# Patient Record
Sex: FEMALE | NOT HISPANIC OR LATINO | URBAN - METROPOLITAN AREA
[De-identification: names, ages, dates, MRNs, and addresses within clinical notes are randomized per-mention and may not be internally consistent; named-entity substitution may affect disease eponyms.]

---

## 2023-12-20 ENCOUNTER — OCCMED (OUTPATIENT)
Dept: URGENT CARE | Facility: CLINIC | Age: 38
End: 2023-12-20

## 2023-12-20 ENCOUNTER — APPOINTMENT (OUTPATIENT)
Dept: LAB | Facility: CLINIC | Age: 38
End: 2023-12-20

## 2023-12-20 DIAGNOSIS — Z02.1 PRE-EMPLOYMENT EXAMINATION: Primary | ICD-10-CM

## 2023-12-20 DIAGNOSIS — Z02.1 PRE-EMPLOYMENT EXAMINATION: ICD-10-CM

## 2023-12-20 LAB
MEV IGG SER QL IA: NORMAL
MUV IGG SER QL IA: NORMAL
RUBV IGG SERPL IA-ACNC: 66.4 IU/ML
VZV IGG SER QL IA: NORMAL

## 2023-12-20 PROCEDURE — 86735 MUMPS ANTIBODY: CPT

## 2023-12-20 PROCEDURE — 36415 COLL VENOUS BLD VENIPUNCTURE: CPT

## 2023-12-20 PROCEDURE — 86787 VARICELLA-ZOSTER ANTIBODY: CPT

## 2023-12-20 PROCEDURE — 86765 RUBEOLA ANTIBODY: CPT

## 2023-12-20 PROCEDURE — 86480 TB TEST CELL IMMUN MEASURE: CPT

## 2023-12-20 PROCEDURE — 86762 RUBELLA ANTIBODY: CPT

## 2023-12-21 LAB
GAMMA INTERFERON BACKGROUND BLD IA-ACNC: <0 IU/ML
M TB IFN-G BLD-IMP: NEGATIVE
M TB IFN-G CD4+ BCKGRND COR BLD-ACNC: 0 IU/ML
M TB IFN-G CD4+ BCKGRND COR BLD-ACNC: 0 IU/ML
MITOGEN IGNF BCKGRD COR BLD-ACNC: 10 IU/ML

## 2024-01-20 DIAGNOSIS — Z00.6 ENCOUNTER FOR EXAMINATION FOR NORMAL COMPARISON OR CONTROL IN CLINICAL RESEARCH PROGRAM: ICD-10-CM

## 2024-04-12 ENCOUNTER — OFFICE VISIT (OUTPATIENT)
Dept: FAMILY MEDICINE CLINIC | Facility: CLINIC | Age: 39
End: 2024-04-12

## 2024-04-12 VITALS
HEART RATE: 90 BPM | BODY MASS INDEX: 47.46 KG/M2 | DIASTOLIC BLOOD PRESSURE: 80 MMHG | WEIGHT: 278 LBS | TEMPERATURE: 98.2 F | SYSTOLIC BLOOD PRESSURE: 110 MMHG | HEIGHT: 64 IN | OXYGEN SATURATION: 99 %

## 2024-04-12 DIAGNOSIS — Z13.220 SCREENING FOR LIPID DISORDERS: ICD-10-CM

## 2024-04-12 DIAGNOSIS — Z13.1 SCREENING FOR DIABETES MELLITUS: ICD-10-CM

## 2024-04-12 DIAGNOSIS — G43.909 MIGRAINE WITHOUT STATUS MIGRAINOSUS, NOT INTRACTABLE, UNSPECIFIED MIGRAINE TYPE: Primary | ICD-10-CM

## 2024-04-12 DIAGNOSIS — R00.2 PALPITATIONS: ICD-10-CM

## 2024-04-12 NOTE — ASSESSMENT & PLAN NOTE
Patient with 20-year history of migraines, with symptoms that are concerning for complicated migraines.  She had an MRI completed which was normal.  She has been on multiple medications for her migraines as prescribed by [facility] neurology, but she discontinued taking these medications as she does not like to take many pills.  Her migraines are returning however and she is experiencing symptoms such as numbness and tingling when she bends her head forward and back.  Will refer to neurology for her to become established with them for resumption of her medication therapy.  Discussed with her that the wait to get in neurology can be quite some time, and I would be willing to start a preventative medication such as Topamax in the meantime if needed.  Patient agreeable with this.

## 2024-04-12 NOTE — PROGRESS NOTES
Name: Raegan Fererll      : 1985      MRN: [id number]  Encounter Provider: Alysia Marroquin PA-C  Encounter Date: 2024   Encounter department: [facility]    Assessment & Plan     1. Migraine without status migrainosus, not intractable, unspecified migraine type  Assessment & Plan:  Patient with 20-year history of migraines, with symptoms that are concerning for complicated migraines.  She had an MRI completed which was normal.  She has been on multiple medications for her migraines as prescribed by [facility] neurology, but she discontinued taking these medications as she does not like to take many pills.  Her migraines are returning however and she is experiencing symptoms such as numbness and tingling when she bends her head forward and back.  Will refer to neurology for her to become established with them for resumption of her medication therapy.  Discussed with her that the wait to get in neurology can be quite some time, and I would be willing to start a preventative medication such as Topamax in the meantime if needed.  Patient agreeable with this.    Orders:  -     Ambulatory Referral to Neurology; Future    2. Palpitations  Assessment & Plan:  Patient with 2-week history of palpitations, no chest pain or shortness of breath.  EKG completed today normal sinus rhythm, rate 88 bpm.  Patient believes palpitations to be in part due to anxiety.  Will complete cardiac workup with Holter monitor to determine, however if Holter monitor is normal will suggest possibly treating her anxiety, which she is agreeable with this plan.  I will also evaluate lab work as she has not had any in quite some time.  Evaluating CBC, CMP, TSH, lipid panel, and A1c.  I would like to see patient for follow-up in 1 month to give her enough time to obtain the Holter monitor and for us to receive the results.  We will then go over the results at her appointment as well as discuss treatment from there.   "Patient is very agreeable with this plan and will follow-up sooner if needed.    Orders:  -     POCT ECG  -     CBC and differential; Future; Expected date: 04/12/2024  -     TSH, 3rd generation with Free T4 reflex; Future; Expected date: 04/12/2024  -     Holter monitor; Future; Expected date: 04/12/2024    3. BMI 45.0-49.9, adult ([facility])  Assessment & Plan:  Patient is committed to making a change in her diet and exercise.  Discussed referral to weight management today to help her with the process of weight loss, she is very agreeable and very appreciative of referral today.    Orders:  -     Ambulatory Referral to Weight Management; Future    4. Screening for lipid disorders  -     Lipid Panel with Direct LDL reflex; Future; Expected date: 04/12/2024    5. Screening for diabetes mellitus  -     Comprehensive metabolic panel; Future; Expected date: 04/12/2024  -     Hemoglobin A1C; Future; Expected date: 04/12/2024           Subjective      Chief Complaint   Patient presents with    Establish Care    Palpitations     Feels like fluttering  Started feeling 2 weeks ago  Off and on  Head tilt up or down induces a rush feeling in your brain and mind goes unclear    Arm Pain     Started about 3 days ago  Tingling  Feels like it is falling asleep    Migraine     Had for over 20 years  Discussion about possible medications       Patient presents today to establish care with our practice and she also has several concerns.    Patient has dealt with migraines for over 20 years, and they are usually related to her menstrual cycle.  She states that she has been on multiple medications for it in the past, but she recently discontinued her medications as she does not like to take them frequently.  She [facility].  She states her migraines occur very often.  She states when she bends down she gets a rush to the brain and down the shoulders.  She states this \"rush\" feels like the nerves start to fire.  She " "states she also feels this when she tilts her head back-and-forth.  She would like to be evaluated for migraines again and possibly start medication again.    She also started with heart fluttering about 2 weeks ago.  She endorses no chest pain or shortness of breath.  She states the fluttering occurs every so often and is not consistent.  It occurs with randomly throughout the day.  She has never had this before.  She believes it is in part due to anxiety however is concern for possible cardiac etiology.    She also has a history of a mucous retention cyst in her sinuses.  She had a case of neon fluid dripping from her nose a few days ago.  She is concerned that it was possibly \"fluid from her brain\".  She recently was sick in February with an upper respiratory infection.      Review of Systems   Constitutional:  Negative for chills, fatigue and fever.   Respiratory:  Positive for chest tightness. Negative for cough and shortness of breath.    Cardiovascular:  Positive for palpitations. Negative for chest pain and leg swelling.   Neurological:  Positive for headaches. Negative for dizziness and light-headedness.       No current outpatient medications on file prior to visit.       Objective     /80 (BP Location: Left arm, Patient Position: Sitting, Cuff Size: Standard)   Pulse 90   Temp 98.2 °F (36.8 °C) (Temporal)   Ht 5' 4\" (1.626 m)   Wt 126 kg (278 lb)   SpO2 99%   BMI 47.72 kg/m²     Physical Exam  Vitals and nursing note reviewed.   Constitutional:       General: She is not in acute distress.     Appearance: Normal appearance. She is not ill-appearing or diaphoretic.   HENT:      Head: Normocephalic and atraumatic.   Cardiovascular:      Rate and Rhythm: Normal rate and regular rhythm.      Heart sounds: No murmur heard.  Pulmonary:      Effort: Pulmonary effort is normal. No respiratory distress.   Musculoskeletal:      Right lower leg: No edema.      Left lower leg: No edema.   Skin:     " Coloration: Skin is not cyanotic or pale.   Neurological:      General: No focal deficit present.      Mental Status: She is alert and oriented to person, place, and time. Mental status is at baseline.   Psychiatric:         Mood and Affect: Mood normal.         Behavior: Behavior normal. Behavior is cooperative.         Judgment: Judgment normal.       Alysia Marroquin PA-C

## 2024-04-12 NOTE — ASSESSMENT & PLAN NOTE
Patient with 2-week history of palpitations, no chest pain or shortness of breath.  EKG completed today normal sinus rhythm, rate 88 bpm.  Patient believes palpitations to be in part due to anxiety.  Will complete cardiac workup with Holter monitor to determine, however if Holter monitor is normal will suggest possibly treating her anxiety, which she is agreeable with this plan.  I will also evaluate lab work as she has not had any in quite some time.  Evaluating CBC, CMP, TSH, lipid panel, and A1c.  I would like to see patient for follow-up in 1 month to give her enough time to obtain the Holter monitor and for us to receive the results.  We will then go over the results at her appointment as well as discuss treatment from there.  Patient is very agreeable with this plan and will follow-up sooner if needed.

## 2024-04-12 NOTE — ASSESSMENT & PLAN NOTE
Patient is committed to making a change in her diet and exercise.  Discussed referral to weight management today to help her with the process of weight loss, she is very agreeable and very appreciative of referral today.

## 2024-04-13 ENCOUNTER — APPOINTMENT (OUTPATIENT)
Dept: LAB | Facility: CLINIC | Age: 39
End: 2024-04-13
Payer: COMMERCIAL

## 2024-04-13 DIAGNOSIS — R00.2 PALPITATIONS: ICD-10-CM

## 2024-04-13 DIAGNOSIS — Z00.6 ENCOUNTER FOR EXAMINATION FOR NORMAL COMPARISON OR CONTROL IN CLINICAL RESEARCH PROGRAM: ICD-10-CM

## 2024-04-13 DIAGNOSIS — Z13.1 SCREENING FOR DIABETES MELLITUS: ICD-10-CM

## 2024-04-13 DIAGNOSIS — Z13.220 SCREENING FOR LIPID DISORDERS: ICD-10-CM

## 2024-04-13 LAB
BASOPHILS # BLD AUTO: 0.02 THOUSANDS/ÂΜL (ref 0–0.1)
BASOPHILS NFR BLD AUTO: 0 % (ref 0–1)
EOSINOPHIL # BLD AUTO: 0.16 THOUSAND/ÂΜL (ref 0–0.61)
EOSINOPHIL NFR BLD AUTO: 2 % (ref 0–6)
ERYTHROCYTE [DISTWIDTH] IN BLOOD BY AUTOMATED COUNT: 16.1 % (ref 11.6–15.1)
HCT VFR BLD AUTO: 38.6 % (ref 34.8–46.1)
HGB BLD-MCNC: 11.6 G/DL (ref 11.5–15.4)
IMM GRANULOCYTES # BLD AUTO: 0.02 THOUSAND/UL (ref 0–0.2)
IMM GRANULOCYTES NFR BLD AUTO: 0 % (ref 0–2)
LYMPHOCYTES # BLD AUTO: 2.65 THOUSANDS/ÂΜL (ref 0.6–4.47)
LYMPHOCYTES NFR BLD AUTO: 31 % (ref 14–44)
MCH RBC QN AUTO: 24.8 PG (ref 26.8–34.3)
MCHC RBC AUTO-ENTMCNC: 30.1 G/DL (ref 31.4–37.4)
MCV RBC AUTO: 83 FL (ref 82–98)
MONOCYTES # BLD AUTO: 0.55 THOUSAND/ÂΜL (ref 0.17–1.22)
MONOCYTES NFR BLD AUTO: 6 % (ref 4–12)
NEUTROPHILS # BLD AUTO: 5.24 THOUSANDS/ÂΜL (ref 1.85–7.62)
NEUTS SEG NFR BLD AUTO: 61 % (ref 43–75)
NRBC BLD AUTO-RTO: 0 /100 WBCS
PLATELET # BLD AUTO: 364 THOUSANDS/UL (ref 149–390)
PMV BLD AUTO: 9.7 FL (ref 8.9–12.7)
RBC # BLD AUTO: 4.68 MILLION/UL (ref 3.81–5.12)
TSH SERPL DL<=0.05 MIU/L-ACNC: 1.56 UIU/ML (ref 0.45–4.5)
WBC # BLD AUTO: 8.64 THOUSAND/UL (ref 4.31–10.16)

## 2024-04-13 PROCEDURE — 84443 ASSAY THYROID STIM HORMONE: CPT

## 2024-04-13 PROCEDURE — 83036 HEMOGLOBIN GLYCOSYLATED A1C: CPT

## 2024-04-13 PROCEDURE — 36415 COLL VENOUS BLD VENIPUNCTURE: CPT

## 2024-04-13 PROCEDURE — 80061 LIPID PANEL: CPT

## 2024-04-13 PROCEDURE — 85025 COMPLETE CBC W/AUTO DIFF WBC: CPT

## 2024-04-13 PROCEDURE — 80053 COMPREHEN METABOLIC PANEL: CPT

## 2024-04-14 LAB
ALBUMIN SERPL BCP-MCNC: 3.7 G/DL (ref 3.5–5)
ALP SERPL-CCNC: 59 U/L (ref 34–104)
ALT SERPL W P-5'-P-CCNC: 8 U/L (ref 7–52)
ANION GAP SERPL CALCULATED.3IONS-SCNC: 7 MMOL/L (ref 4–13)
AST SERPL W P-5'-P-CCNC: 12 U/L (ref 13–39)
BILIRUB SERPL-MCNC: 0.36 MG/DL (ref 0.2–1)
BUN SERPL-MCNC: 10 MG/DL (ref 5–25)
CALCIUM SERPL-MCNC: 9 MG/DL (ref 8.4–10.2)
CHLORIDE SERPL-SCNC: 106 MMOL/L (ref 96–108)
CHOLEST SERPL-MCNC: 167 MG/DL
CO2 SERPL-SCNC: 24 MMOL/L (ref 21–32)
CREAT SERPL-MCNC: 0.87 MG/DL (ref 0.6–1.3)
EST. AVERAGE GLUCOSE BLD GHB EST-MCNC: 126 MG/DL
GFR SERPL CREATININE-BSD FRML MDRD: 84 ML/MIN/1.73SQ M
GLUCOSE P FAST SERPL-MCNC: 76 MG/DL (ref 65–99)
HBA1C MFR BLD: 6 %
HDLC SERPL-MCNC: 44 MG/DL
LDLC SERPL CALC-MCNC: 96 MG/DL (ref 0–100)
POTASSIUM SERPL-SCNC: 4.2 MMOL/L (ref 3.5–5.3)
PROT SERPL-MCNC: 6.7 G/DL (ref 6.4–8.4)
SODIUM SERPL-SCNC: 137 MMOL/L (ref 135–147)
TRIGL SERPL-MCNC: 134 MG/DL

## 2024-05-05 LAB
APOB+LDLR+PCSK9 GENE MUT ANL BLD/T: NOT DETECTED
BRCA1+BRCA2 DEL+DUP + FULL MUT ANL BLD/T: NOT DETECTED
MLH1+MSH2+MSH6+PMS2 GN DEL+DUP+FUL M: NOT DETECTED

## 2024-05-14 ENCOUNTER — HOSPITAL ENCOUNTER (OUTPATIENT)
Dept: NON INVASIVE DIAGNOSTICS | Facility: HOSPITAL | Age: 39
Discharge: HOME | End: 2024-05-14
Payer: COMMERCIAL

## 2024-05-14 DIAGNOSIS — R00.2 PALPITATIONS: ICD-10-CM

## 2024-05-14 PROCEDURE — 93225 XTRNL ECG REC<48 HRS REC: CPT

## 2024-05-14 PROCEDURE — 93226 XTRNL ECG REC<48 HR SCAN A/R: CPT

## 2024-05-21 PROCEDURE — 93227 XTRNL ECG REC<48 HR R&I: CPT | Performed by: INTERNAL MEDICINE

## 2024-05-22 ENCOUNTER — OFFICE VISIT (OUTPATIENT)
Dept: FAMILY MEDICINE CLINIC | Facility: CLINIC | Age: 39
End: 2024-05-22
Payer: COMMERCIAL

## 2024-05-22 VITALS
BODY MASS INDEX: 47.12 KG/M2 | OXYGEN SATURATION: 99 % | WEIGHT: 276 LBS | SYSTOLIC BLOOD PRESSURE: 144 MMHG | HEIGHT: 64 IN | TEMPERATURE: 98 F | HEART RATE: 86 BPM | DIASTOLIC BLOOD PRESSURE: 92 MMHG

## 2024-05-22 DIAGNOSIS — F41.9 ANXIETY: ICD-10-CM

## 2024-05-22 DIAGNOSIS — R42 EPISODIC LIGHTHEADEDNESS: Primary | ICD-10-CM

## 2024-05-22 DIAGNOSIS — R07.9 CHEST PAIN, UNSPECIFIED TYPE: ICD-10-CM

## 2024-05-22 PROCEDURE — 99214 OFFICE O/P EST MOD 30 MIN: CPT

## 2024-05-22 RX ORDER — HYDROXYZINE HYDROCHLORIDE 25 MG/1
25 TABLET, FILM COATED ORAL EVERY 6 HOURS PRN
Qty: 60 TABLET | Refills: 0 | Status: SHIPPED | OUTPATIENT
Start: 2024-05-22

## 2024-05-22 NOTE — PROGRESS NOTES
"Ambulatory Visit  Name: Raegan Ferrell      : 1985      MRN: [id number]  Encounter Provider: Alysia Marroquin PA-C  Encounter Date: 2024   Encounter department: [facility]    Assessment & Plan   1. Episodic lightheadedness  Assessment & Plan:  Patient with episodic lightheadedness, chest heaviness, and shortness of breath, which occurs typically at night prior to going to bed. She had one episode last week which awoke her from sleep. Her holter monitor was overall normal with no significant arrhythmias noted. She endorses a feeling of fullness in her neck when this feeling occurs. No bruits heard on exam today. Her cardiac exam was normal.     Discussed exact etiology of symptoms is unknown at this time, however I do not believe her symptoms to be entirely related to anxiety. I would like her to get a carotid study due to the lightheadedness and the \"nerve like\" feeling in her head when these episodes occur. I also believe an ECHO would be of diagnostic benefit, as her symptoms occur at nighttime and I am concerned of a possible valvular dysfunction with her lightheadedness and dizziness. She has no significant family history of cardiac diseases.     She is very agreeable with continued cardiac workup, as she is concerned about her heart as well. If results are normal, may recommend cardiology referral if anxiety treatment does not help with her symptoms.   Orders:  -     Echo complete w/ contrast if indicated; Future; Expected date: 2024  -     VAS carotid complete study; Future; Expected date: 2024  2. Anxiety  Assessment & Plan:  Will treat with hydroxyzine at night to help with anxiety and sleep issues. Discussed ADRs, patient understands risk of drowsiness. Patient would like to try this medication while we continue cardiac workup.   Orders:  -     hydrOXYzine HCL (ATARAX) 25 mg tablet; Take 1 tablet (25 mg total) by mouth every 6 (six) hours as needed for " "anxiety (sleep)  3. Chest pain, unspecified type  -     Echo complete w/ contrast if indicated; Future; Expected date: 05/22/2024       History of Present Illness     Patient presents today for a follow up of palpitations. She has had a few episodes of chest pain and heaviness especially at night during the workweek, which is accompanied by a lightheadedness feeling and the feeling of unable to catch her breath. She states this happened once during the Holter monitor, which she reported. She still feels her anxiety levels are high, but is not sure why these symptoms only happen at night time.         Review of Systems   Constitutional:  Negative for chills, fatigue and fever.   Respiratory:  Negative for cough, chest tightness and shortness of breath.    Cardiovascular:  Positive for chest pain and palpitations. Negative for leg swelling.   Neurological:  Positive for light-headedness. Negative for dizziness, syncope, weakness, numbness and headaches.   Psychiatric/Behavioral:  Positive for dysphoric mood (anxiety). The patient is nervous/anxious.        Objective     /92 (BP Location: Left arm, Patient Position: Sitting, Cuff Size: Standard)   Pulse 86   Temp 98 °F (36.7 °C)   Ht 5' 4.17\" (1.63 m)   Wt 125 kg (276 lb)   LMP 05/13/2024   SpO2 99%   BMI 47.12 kg/m²     Physical Exam  Vitals and nursing note reviewed.   Constitutional:       General: She is not in acute distress.     Appearance: Normal appearance. She is not ill-appearing.   HENT:      Head: Normocephalic and atraumatic.   Neck:      Vascular: No carotid bruit.   Cardiovascular:      Rate and Rhythm: Normal rate and regular rhythm.      Heart sounds: No murmur heard.  Pulmonary:      Effort: Pulmonary effort is normal. No respiratory distress.      Breath sounds: Normal breath sounds. No stridor. No wheezing, rhonchi or rales.   Musculoskeletal:      Cervical back: Normal range of motion. No rigidity.      Right lower leg: No edema.      " Left lower leg: No edema.   Lymphadenopathy:      Cervical: No cervical adenopathy.   Skin:     General: Skin is warm and dry.      Coloration: Skin is not cyanotic or pale.   Neurological:      General: No focal deficit present.      Mental Status: She is alert and oriented to person, place, and time. Mental status is at baseline.   Psychiatric:         Mood and Affect: Mood normal.         Behavior: Behavior normal.         Judgment: Judgment normal.       Administrative Statements     Alysia Marroquin PA-C  [facility]

## 2024-05-22 NOTE — ASSESSMENT & PLAN NOTE
Will treat with hydroxyzine at night to help with anxiety and sleep issues. Discussed ADRs, patient understands risk of drowsiness. Patient would like to try this medication while we continue cardiac workup.

## 2024-05-22 NOTE — ASSESSMENT & PLAN NOTE
"Patient with episodic lightheadedness, chest heaviness, and shortness of breath, which occurs typically at night prior to going to bed. She had one episode last week which awoke her from sleep. Her holter monitor was overall normal with no significant arrhythmias noted. She endorses a feeling of fullness in her neck when this feeling occurs. No bruits heard on exam today. Her cardiac exam was normal.     Discussed exact etiology of symptoms is unknown at this time, however I do not believe her symptoms to be entirely related to anxiety. I would like her to get a carotid study due to the lightheadedness and the \"nerve like\" feeling in her head when these episodes occur. I also believe an ECHO would be of diagnostic benefit, as her symptoms occur at nighttime and I am concerned of a possible valvular dysfunction with her lightheadedness and dizziness. She has no significant family history of cardiac diseases.     She is very agreeable with continued cardiac workup, as she is concerned about her heart as well. If results are normal, may recommend cardiology referral if anxiety treatment does not help with her symptoms.   "

## 2024-06-03 ENCOUNTER — HOSPITAL ENCOUNTER (OUTPATIENT)
Dept: NON INVASIVE DIAGNOSTICS | Facility: HOSPITAL | Age: 39
Discharge: HOME | End: 2024-06-03
Payer: COMMERCIAL

## 2024-06-03 DIAGNOSIS — R42 EPISODIC LIGHTHEADEDNESS: ICD-10-CM

## 2024-06-03 PROCEDURE — 93880 EXTRACRANIAL BILAT STUDY: CPT | Performed by: SURGERY

## 2024-06-03 PROCEDURE — 93880 EXTRACRANIAL BILAT STUDY: CPT

## 2024-06-11 ENCOUNTER — HOSPITAL ENCOUNTER (EMERGENCY)
Facility: HOSPITAL | Age: 39
Discharge: HOME | End: 2024-06-11
Attending: EMERGENCY MEDICINE
Payer: COMMERCIAL

## 2024-06-11 VITALS
OXYGEN SATURATION: 98 % | HEART RATE: 92 BPM | TEMPERATURE: 97.9 F | RESPIRATION RATE: 18 BRPM | BODY MASS INDEX: 47.12 KG/M2 | SYSTOLIC BLOOD PRESSURE: 154 MMHG | DIASTOLIC BLOOD PRESSURE: 98 MMHG | WEIGHT: 276 LBS

## 2024-06-11 DIAGNOSIS — R42 LIGHTHEADEDNESS: ICD-10-CM

## 2024-06-11 DIAGNOSIS — R00.2 PALPITATIONS: Primary | ICD-10-CM

## 2024-06-11 LAB
ALBUMIN SERPL BCP-MCNC: 4 G/DL (ref 3.5–5)
ALP SERPL-CCNC: 73 U/L (ref 34–104)
ALT SERPL W P-5'-P-CCNC: 7 U/L (ref 7–52)
ANION GAP SERPL CALCULATED.3IONS-SCNC: 8 MMOL/L (ref 4–13)
AST SERPL W P-5'-P-CCNC: 12 U/L (ref 13–39)
ATRIAL RATE: 100 BPM
BASOPHILS # BLD AUTO: 0.03 THOUSANDS/ÂΜL (ref 0–0.1)
BASOPHILS NFR BLD AUTO: 0 % (ref 0–1)
BILIRUB SERPL-MCNC: 0.2 MG/DL (ref 0.2–1)
BUN SERPL-MCNC: 13 MG/DL (ref 5–25)
CALCIUM SERPL-MCNC: 9.4 MG/DL (ref 8.4–10.2)
CARDIAC TROPONIN I PNL SERPL HS: <2 NG/L
CHLORIDE SERPL-SCNC: 107 MMOL/L (ref 96–108)
CO2 SERPL-SCNC: 22 MMOL/L (ref 21–32)
CREAT SERPL-MCNC: 1.06 MG/DL (ref 0.6–1.3)
EOSINOPHIL # BLD AUTO: 0.19 THOUSAND/ÂΜL (ref 0–0.61)
EOSINOPHIL NFR BLD AUTO: 2 % (ref 0–6)
ERYTHROCYTE [DISTWIDTH] IN BLOOD BY AUTOMATED COUNT: 16.4 % (ref 11.6–15.1)
GFR SERPL CREATININE-BSD FRML MDRD: 66 ML/MIN/1.73SQ M
GLUCOSE SERPL-MCNC: 98 MG/DL (ref 65–140)
HCT VFR BLD AUTO: 37.7 % (ref 34.8–46.1)
HGB BLD-MCNC: 11.5 G/DL (ref 11.5–15.4)
IMM GRANULOCYTES # BLD AUTO: 0.02 THOUSAND/UL (ref 0–0.2)
IMM GRANULOCYTES NFR BLD AUTO: 0 % (ref 0–2)
LYMPHOCYTES # BLD AUTO: 3.96 THOUSANDS/ÂΜL (ref 0.6–4.47)
LYMPHOCYTES NFR BLD AUTO: 39 % (ref 14–44)
MCH RBC QN AUTO: 24.7 PG (ref 26.8–34.3)
MCHC RBC AUTO-ENTMCNC: 30.5 G/DL (ref 31.4–37.4)
MCV RBC AUTO: 81 FL (ref 82–98)
MONOCYTES # BLD AUTO: 0.64 THOUSAND/ÂΜL (ref 0.17–1.22)
MONOCYTES NFR BLD AUTO: 6 % (ref 4–12)
NEUTROPHILS # BLD AUTO: 5.3 THOUSANDS/ÂΜL (ref 1.85–7.62)
NEUTS SEG NFR BLD AUTO: 53 % (ref 43–75)
NRBC BLD AUTO-RTO: 0 /100 WBCS
P AXIS: 29 DEGREES
PLATELET # BLD AUTO: 368 THOUSANDS/UL (ref 149–390)
PMV BLD AUTO: 9.2 FL (ref 8.9–12.7)
POTASSIUM SERPL-SCNC: 3.5 MMOL/L (ref 3.5–5.3)
PR INTERVAL: 172 MS
PROT SERPL-MCNC: 7.5 G/DL (ref 6.4–8.4)
QRS AXIS: 19 DEGREES
QRSD INTERVAL: 80 MS
QT INTERVAL: 356 MS
QTC INTERVAL: 459 MS
RBC # BLD AUTO: 4.66 MILLION/UL (ref 3.81–5.12)
SODIUM SERPL-SCNC: 137 MMOL/L (ref 135–147)
T WAVE AXIS: 27 DEGREES
TSH SERPL DL<=0.05 MIU/L-ACNC: 3.08 UIU/ML (ref 0.45–4.5)
VENTRICULAR RATE: 100 BPM
WBC # BLD AUTO: 10.14 THOUSAND/UL (ref 4.31–10.16)

## 2024-06-11 PROCEDURE — 84443 ASSAY THYROID STIM HORMONE: CPT | Performed by: PHYSICIAN ASSISTANT

## 2024-06-11 PROCEDURE — 99285 EMERGENCY DEPT VISIT HI MDM: CPT | Performed by: PHYSICIAN ASSISTANT

## 2024-06-11 PROCEDURE — 93005 ELECTROCARDIOGRAM TRACING: CPT

## 2024-06-11 PROCEDURE — 84484 ASSAY OF TROPONIN QUANT: CPT | Performed by: PHYSICIAN ASSISTANT

## 2024-06-11 PROCEDURE — 93010 ELECTROCARDIOGRAM REPORT: CPT | Performed by: INTERNAL MEDICINE

## 2024-06-11 PROCEDURE — 96361 HYDRATE IV INFUSION ADD-ON: CPT

## 2024-06-11 PROCEDURE — 99285 EMERGENCY DEPT VISIT HI MDM: CPT

## 2024-06-11 PROCEDURE — 85025 COMPLETE CBC W/AUTO DIFF WBC: CPT | Performed by: PHYSICIAN ASSISTANT

## 2024-06-11 PROCEDURE — 96360 HYDRATION IV INFUSION INIT: CPT

## 2024-06-11 PROCEDURE — 80053 COMPREHEN METABOLIC PANEL: CPT | Performed by: PHYSICIAN ASSISTANT

## 2024-06-11 PROCEDURE — 36415 COLL VENOUS BLD VENIPUNCTURE: CPT | Performed by: PHYSICIAN ASSISTANT

## 2024-06-11 RX ADMIN — SODIUM CHLORIDE 1000 ML: 0.9 INJECTION, SOLUTION INTRAVENOUS at 20:01

## 2024-06-12 NOTE — ED PROVIDER NOTES
History  Chief Complaint   Patient presents with    Palpitations     States about 15 min prior to arrival felt like heart was racing. Also felt lightheaded. States feels really shaky. Denies chest pain. Complaining of R arm pain.     This is a 39-year-old female presenting to ED for evaluation of palpitations starting approximately 15 minutes prior to arrival.  She states that she had an episode of lightheadedness, diaphoresis and palpitations while in the car at home.  She states that she went inside and had an additional episode of the same symptoms.  She states that she feels shaky at this time but believes it may be nerves.  She states that she normally has right arm pain, this is not new or just associated with symptoms today.  She denies any shortness of breath, pleurisy, HA.  She denies any chest pain associated with symptoms.  She denies any N/V.  She denies any symptoms currently.  She does admit to poor oral intake today.  Patient is also currently menstruating.      History provided by:  Patient   used: No        Prior to Admission Medications   Prescriptions Last Dose Informant Patient Reported? Taking?   hydrOXYzine HCL (ATARAX) 25 mg tablet   No No   Sig: Take 1 tablet (25 mg total) by mouth every 6 (six) hours as needed for anxiety (sleep)      Facility-Administered Medications: None       Past Medical History:   Diagnosis Date    Headache(784.0) over 15 years       Past Surgical History:   Procedure Laterality Date     SECTION         Family History   Problem Relation Age of Onset    Breast cancer Mother     Breast cancer Maternal Grandmother      I have reviewed and agree with the history as documented.    E-Cigarette/Vaping    E-Cigarette Use Never User      E-Cigarette/Vaping Substances    Nicotine No     THC No     CBD No     Flavoring No     Other No     Unknown No      Social History     Tobacco Use    Smoking status: Never    Smokeless tobacco: Never   Vaping  Use    Vaping status: Never Used   Substance Use Topics    Alcohol use: Yes     Comment: 1 or 2 monthly    Drug use: Never       Review of Systems   Constitutional:  Negative for chills and fever.   Respiratory:  Negative for shortness of breath.    Cardiovascular:  Positive for palpitations. Negative for chest pain and leg swelling.   Gastrointestinal:  Negative for abdominal pain, diarrhea, nausea and vomiting.   Genitourinary:  Negative for dysuria and frequency.   Musculoskeletal:  Negative for back pain and neck pain.   Neurological:  Positive for light-headedness. Negative for weakness, numbness and headaches.   All other systems reviewed and are negative.      Physical Exam  Physical Exam  Vitals reviewed.   Constitutional:       General: She is not in acute distress.     Appearance: Normal appearance. She is well-developed and well-groomed. She is not ill-appearing, toxic-appearing or diaphoretic.   HENT:      Head: Normocephalic and atraumatic.      Right Ear: External ear normal.      Left Ear: External ear normal.      Nose: Nose normal. No congestion or rhinorrhea.      Mouth/Throat:      Mouth: Mucous membranes are moist.      Pharynx: Oropharynx is clear. No oropharyngeal exudate or posterior oropharyngeal erythema.   Eyes:      General: No scleral icterus.        Right eye: No discharge.         Left eye: No discharge.      Extraocular Movements: Extraocular movements intact.      Conjunctiva/sclera: Conjunctivae normal.   Cardiovascular:      Rate and Rhythm: Normal rate and regular rhythm.      Pulses: Normal pulses.      Heart sounds: No murmur heard.     No friction rub. No gallop.   Pulmonary:      Effort: Pulmonary effort is normal. No respiratory distress.      Breath sounds: Normal breath sounds. No wheezing, rhonchi or rales.   Abdominal:      General: Abdomen is flat. There is no distension.      Palpations: Abdomen is soft.      Tenderness: There is no abdominal tenderness. There is no  right CVA tenderness, left CVA tenderness, guarding or rebound.   Musculoskeletal:         General: No deformity. Normal range of motion.      Cervical back: Normal range of motion and neck supple.   Skin:     General: Skin is warm and dry.      Coloration: Skin is not jaundiced or pale.      Findings: No rash.   Neurological:      General: No focal deficit present.      Mental Status: She is alert.   Psychiatric:         Mood and Affect: Mood normal.         Behavior: Behavior normal. Behavior is cooperative.         Vital Signs  ED Triage Vitals [06/11/24 1931]   Temperature Pulse Respirations Blood Pressure SpO2   97.9 °F (36.6 °C) 92 18 154/98 98 %      Temp Source Heart Rate Source Patient Position - Orthostatic VS BP Location FiO2 (%)   Oral Monitor Lying Left arm --      Pain Score       2           Vitals:    06/11/24 1931   BP: 154/98   Pulse: 92   Patient Position - Orthostatic VS: Lying         Visual Acuity      ED Medications  Medications   sodium chloride 0.9 % bolus 1,000 mL (1,000 mL Intravenous New Bag 6/11/24 2001)       Diagnostic Studies  Results Reviewed       Procedure Component Value Units Date/Time    TSH, 3rd generation with Free T4 reflex [[id number]]  (Normal) Collected: 06/11/24 1959    Lab Status: Final result Specimen: Blood from Arm, Right Updated: 06/11/24 2131     TSH 3RD GENERATON 3.083 uIU/mL     HS Troponin 0hr (reflex protocol) [[id number]]  (Normal) Collected: 06/11/24 1959    Lab Status: Final result Specimen: Blood from Arm, Left Updated: 06/11/24 2046     hs TnI 0hr <2 ng/L     Comprehensive metabolic panel [[id number]]  (Abnormal) Collected: 06/11/24 1959    Lab Status: Final result Specimen: Blood from Arm, Right Updated: 06/11/24 2039     Sodium 137 mmol/L      Potassium 3.5 mmol/L      Chloride 107 mmol/L      CO2 22 mmol/L      ANION GAP 8 mmol/L      BUN 13 mg/dL      Creatinine 1.06 mg/dL      Glucose 98 mg/dL      Calcium 9.4 mg/dL      AST 12 U/L      ALT 7 U/L       Alkaline Phosphatase 73 U/L      Total Protein 7.5 g/dL      Albumin 4.0 g/dL      Total Bilirubin 0.20 mg/dL      eGFR 66 ml/min/1.73sq m     Narrative:      National Kid[employer] Foundation guidelines for Chronic Kidney Disease (CKD):     Stage 1 with normal or high GFR (GFR > 90 mL/min/1.73 square meters)    Stage 2 Mild CKD (GFR = 60-89 mL/min/1.73 square meters)    Stage 3A Moderate CKD (GFR = 45-59 mL/min/1.73 square meters)    Stage 3B Moderate CKD (GFR = 30-44 mL/min/1.73 square meters)    Stage 4 Severe CKD (GFR = 15-29 mL/min/1.73 square meters)    Stage 5 End Stage CKD (GFR <15 mL/min/1.73 square meters)  Note: GFR calculation is accurate only with a steady state creatinine    CBC and differential [[id number]]  (Abnormal) Collected: 06/11/24 1959    Lab Status: Final result Specimen: Blood from Arm, Left Updated: 06/11/24 2016     WBC 10.14 Thousand/uL      RBC 4.66 Million/uL      Hemoglobin 11.5 g/dL      Hematocrit 37.7 %      MCV 81 fL      MCH 24.7 pg      MCHC 30.5 g/dL      RDW 16.4 %      MPV 9.2 fL      Platelets 368 Thousands/uL      nRBC 0 /100 WBCs      Segmented % 53 %      Immature Grans % 0 %      Lymphocytes % 39 %      Monocytes % 6 %      Eosinophils Relative 2 %      Basophils Relative 0 %      Absolute Neutrophils 5.30 Thousands/µL      Absolute Immature Grans 0.02 Thousand/uL      Absolute Lymphocytes 3.96 Thousands/µL      Absolute Monocytes 0.64 Thousand/µL      Eosinophils Absolute 0.19 Thousand/µL      Basophils Absolute 0.03 Thousands/µL     POCT pregnancy, urine [[id number]]     Lab Status: No result                    No orders to display              Procedures  ECG 12 Lead Documentation Only    Date/Time: 6/11/2024 9:31 PM    Performed by: Letitia Salazar PA-C  Authorized by: Letitia Salazar PA-C    Indications / Diagnosis:  Palpitations  ECG reviewed by me, the ED Provider: yes    Patient location:  ED  Interpretation:     Interpretation: abnormal    Rate:     ECG rate:   100    ECG rate assessment: tachycardic    Rhythm:     Rhythm: sinus rhythm    Ectopy:     Ectopy: PAC    Other findings:     Other findings: LVH             ED Course  ED Course as of 06/11/24 2139 Tue Jun 11, 2024 2045 Hemoglobin: 11.5   2045 Potassium: 3.5   2045 BUN: 13   2047 hs TnI 0hr: <2   2132 TSH 3RD GENERATON: 3.083             Medical Decision Making      DDx including but not limited to: metabolic abnormality, cardiac arrhythmia, thyroid disease,anxiety, adverse reaction; doubt  PE, ACS, MI.    Will order CBC for eval of anemia and leukocytosis, CMP for eval of LFTs, kidney function and electrolyte abnormalities.  Troponin for evaluation of heart strain, patient will not need delta troponin. EKG for evaluation of arrhythmia and ACS.  TSH for evaluation of thyroid disease.    Prior to discharge, discharge instructions were discussed with patient at bedside. Patient was provided both verbal and written instructions. Patient is understanding of the discharge instructions and is agreeable to plan of care. Return precautions were discussed with patient bedside, patient verbalized understanding of signs and symptoms that would necessitate return to the ED. All questions were answered. Patient was comfortable with the plan of care and discharged to home.     Dispo: discharge home with follow up to PCP. Patient stable, in no acute distress and non-toxic at discharge.    Problems Addressed:  Lightheadedness: acute illness or injury  Palpitations: acute illness or injury    Amount and/or Complexity of Data Reviewed  Labs: ordered. Decision-making details documented in ED Course.             Disposition  Final diagnoses:   Palpitations   Lightheadedness     Time reflects when diagnosis was documented in both MDM as applicable and the Disposition within this note       Time User Action Codes Description Comment    6/11/2024  9:32 PM Letitia Salazar Add [R00.2] Palpitations     6/11/2024  9:32 PM Martin  Letitia Mackay [R42] Lightheadedness           ED Disposition       ED Disposition   Discharge    Condition   Stable    Date/Time   Tue Jun 11, 2024  9:32 PM    Comment   Raegan Ferrell discharge to home/self care.             Follow-up Information       Follow up With Specialties Details Why Contact Info    Alysia Marroquin PA-C Family Medicine, Physician Assistant Schedule an appointment as soon as possible for a visit  As needed [address]   [address]  [place] [address] [phone]              Current Discharge Medication List        CONTINUE these medications which have NOT CHANGED    Details   hydrOXYzine HCL (ATARAX) 25 mg tablet Take 1 tablet (25 mg total) by mouth every 6 (six) hours as needed for anxiety (sleep)  Qty: 60 tablet, Refills: 0    Associated Diagnoses: Anxiety             No discharge procedures on file.    PDMP Review       None            ED Provider  Electronically Signed by KATE Dominguez PA-C  06/11/24 1017

## 2024-06-14 ENCOUNTER — HOSPITAL ENCOUNTER (OUTPATIENT)
Dept: NON INVASIVE DIAGNOSTICS | Facility: HOSPITAL | Age: 39
Discharge: HOME | End: 2024-06-14
Payer: COMMERCIAL

## 2024-06-14 VITALS
BODY MASS INDEX: 47.12 KG/M2 | WEIGHT: 276 LBS | HEIGHT: 64 IN | SYSTOLIC BLOOD PRESSURE: 154 MMHG | DIASTOLIC BLOOD PRESSURE: 98 MMHG | HEART RATE: 92 BPM

## 2024-06-14 DIAGNOSIS — R42 EPISODIC LIGHTHEADEDNESS: ICD-10-CM

## 2024-06-14 DIAGNOSIS — R07.9 CHEST PAIN, UNSPECIFIED TYPE: ICD-10-CM

## 2024-06-14 LAB
AORTIC ROOT: 3 CM
APICAL FOUR CHAMBER EJECTION FRACTION: 50 %
ASCENDING AORTA: 3 CM
BSA FOR ECHO PROCEDURE: 2.25 M2
E WAVE DECELERATION TIME: 224 MS
E/A RATIO: 1.18
FRACTIONAL SHORTENING: 29 (ref 28–44)
INTERVENTRICULAR SEPTUM IN DIASTOLE (PARASTERNAL SHORT AXIS VIEW): 1.1 CM
INTERVENTRICULAR SEPTUM: 1.1 CM (ref 0.6–1.1)
LAAS-AP2: 15.2 CM2
LAAS-AP4: 18.2 CM2
LEFT ATRIUM SIZE: 3.5 CM
LEFT ATRIUM VOLUME (MOD BIPLANE): 46 ML
LEFT ATRIUM VOLUME INDEX (MOD BIPLANE): 20.4 ML/M2
LEFT INTERNAL DIMENSION IN SYSTOLE: 3.5 CM (ref 2.1–4)
LEFT VENTRICULAR INTERNAL DIMENSION IN DIASTOLE: 4.9 CM (ref 3.5–6)
LEFT VENTRICULAR POSTERIOR WALL IN END DIASTOLE: 0.9 CM
LEFT VENTRICULAR STROKE VOLUME: 63 ML
LVSV (TEICH): 63 ML
MV E'TISSUE VEL-SEP: 13 CM/S
MV PEAK A VEL: 0.77 M/S
MV PEAK E VEL: 91 CM/S
MV STENOSIS PRESSURE HALF TIME: 65 MS
MV VALVE AREA P 1/2 METHOD: 3.38
RA PRESSURE ESTIMATED: 3 MMHG
RIGHT ATRIUM AREA SYSTOLE A4C: 15 CM2
RIGHT VENTRICLE ID DIMENSION: 4.6 CM
RV PSP: 19 MMHG
SL CV LEFT ATRIUM LENGTH A2C: 4.8 CM
SL CV PED ECHO LEFT VENTRICLE DIASTOLIC VOLUME (MOD BIPLANE) 2D: 112 ML
SL CV PED ECHO LEFT VENTRICLE SYSTOLIC VOLUME (MOD BIPLANE) 2D: 50 ML
TR MAX PG: 16 MMHG
TR PEAK VELOCITY: 2 M/S
TRICUSPID ANNULAR PLANE SYSTOLIC EXCURSION: 2.6 CM
TRICUSPID VALVE PEAK REGURGITATION VELOCITY: 2.01 M/S

## 2024-06-14 PROCEDURE — 93306 TTE W/DOPPLER COMPLETE: CPT | Performed by: INTERNAL MEDICINE

## 2024-06-14 PROCEDURE — 93306 TTE W/DOPPLER COMPLETE: CPT

## 2024-07-02 ENCOUNTER — OFFICE VISIT (OUTPATIENT)
Dept: FAMILY MEDICINE CLINIC | Facility: CLINIC | Age: 39
End: 2024-07-02
Payer: COMMERCIAL

## 2024-07-02 VITALS
HEART RATE: 81 BPM | SYSTOLIC BLOOD PRESSURE: 122 MMHG | BODY MASS INDEX: 46.78 KG/M2 | TEMPERATURE: 98 F | OXYGEN SATURATION: 98 % | WEIGHT: 274 LBS | DIASTOLIC BLOOD PRESSURE: 82 MMHG

## 2024-07-02 DIAGNOSIS — R19.7 DIARRHEA, UNSPECIFIED TYPE: ICD-10-CM

## 2024-07-02 DIAGNOSIS — G43.909 MIGRAINE WITHOUT STATUS MIGRAINOSUS, NOT INTRACTABLE, UNSPECIFIED MIGRAINE TYPE: ICD-10-CM

## 2024-07-02 DIAGNOSIS — F41.9 ANXIETY: Primary | ICD-10-CM

## 2024-07-02 DIAGNOSIS — K90.41 GLUTEN INTOLERANCE: ICD-10-CM

## 2024-07-02 PROCEDURE — 99214 OFFICE O/P EST MOD 30 MIN: CPT

## 2024-07-02 RX ORDER — FLUOXETINE 10 MG/1
10 CAPSULE ORAL DAILY
Qty: 30 CAPSULE | Refills: 1 | Status: SHIPPED | OUTPATIENT
Start: 2024-07-02

## 2024-07-02 NOTE — PROGRESS NOTES
Ambulatory Visit  Name: Raegan Ferrell      : 1985      MRN: [id number]  Encounter Provider: Alysia Marroquin PA-C  Encounter Date: 2024   Encounter department: [facility]    Assessment & Plan   1. Anxiety  Assessment & Plan:  Patient presents today for 1 month follow-up to review all of her testing that has been completed in the past month for her lightheadedness.  We have completed several cardiac tests, echo was normal as well as carotid study.  After reviewing these test with her today, her and I have discussed her anxiety which is likely causing some of the symptoms.  Her and I discussed further treatment for her anxiety with a daily medication.  She does feel like she has obsessive thoughts especially over her health.  Hydroxyzine has not been helping with her symptoms as much as she had hoped.  Discussed use of SSRI medication such as Prozac which is weight neutral and has been shown to help with obsessive thoughts.  She is very agreeable with starting a low-dose of this medication.  She understands efficacy is not obtained until 4 to 6 weeks of use.  We discussed ADRs of medication.  Patient can still use hydroxyzine as needed with this medication.  She will follow-up with me in about 4 to 6 weeks for follow-up to determine success of treatment.  Orders:  -     FLUoxetine (PROzac) 10 mg capsule; Take 1 capsule (10 mg total) by mouth daily  2. Diarrhea, unspecified type  Assessment & Plan:  Patient with recent bowel movement changes in the past couple of months.  She states she has days where she will not have a bowel movement and then when she does it will be diarrhea throughout the entire day.  With recent change in bowels, recommend referral to GI for an evaluation colonoscopy.  No blood in the bowels.  No abdominal pain.  We will also test her for a gluten allergy with blood work.  Orders:  -     Ambulatory Referral to Gastroenterology; Future  -     Celiac Disease  Panel; Future  3. Migraine without status migrainosus, not intractable, unspecified migraine type  Assessment & Plan:  Has not been able to get in with [facility] neurology yet however will be calling eventually to schedule.  4. BMI 45.0-49.9, adult (HCC)  Assessment & Plan:  Patient is seeing weight management in the next coming months for an evaluation.  I discussed with her today that several of her symptoms do appear to be due to deconditioning due to her weight, since we have had a negative cardiac workup.  Patient understands and is still agreeable with weight management evaluation.  5. Gluten intolerance  -     Celiac Disease Panel; Future       History of Present Illness     Patient presents today for follow-up and to review her lab results.  She still occasionally feels lightheaded at times and did go to the ER 1 time back in June for palpitations and lightheadedness.  She sometimes also will experience shortness of breath especially when going up stairs or walking longer distances.  She feels her anxiety may be causing some of her symptoms as she tends to overthink throughout the day especially about her health.  Hydroxyzine has not been helping with her anxiety is much as she had hoped.  No chest pain or palpitations.        Review of Systems   Constitutional:  Negative for chills, fatigue and fever.   Respiratory:  Positive for shortness of breath. Negative for cough and chest tightness.    Cardiovascular:  Negative for chest pain, palpitations and leg swelling.   Neurological:  Positive for light-headedness. Negative for dizziness, numbness and headaches.       Objective     /82   Pulse 81   Temp 98 °F (36.7 °C) (Temporal)   Wt 124 kg (274 lb)   LMP 06/11/2024   SpO2 98%   BMI 46.78 kg/m²     Physical Exam  Vitals and nursing note reviewed.   Constitutional:       General: She is not in acute distress.     Appearance: Normal appearance. She is normal weight. She is not ill-appearing.   HENT:       Head: Normocephalic and atraumatic.   Cardiovascular:      Rate and Rhythm: Normal rate and regular rhythm.   Pulmonary:      Effort: Pulmonary effort is normal. No respiratory distress.      Breath sounds: Normal breath sounds.   Musculoskeletal:      Right lower leg: No edema.      Left lower leg: No edema.   Skin:     General: Skin is warm and dry.      Coloration: Skin is not cyanotic or pale.   Neurological:      General: No focal deficit present.      Mental Status: She is alert and oriented to person, place, and time. Mental status is at baseline.   Psychiatric:         Mood and Affect: Mood normal.         Behavior: Behavior normal.         Judgment: Judgment normal.       Administrative Statements     Alysia Marroquin PA-C  [facility]

## 2024-07-03 PROBLEM — R19.7 DIARRHEA: Status: ACTIVE | Noted: 2024-07-03

## 2024-07-03 PROBLEM — K90.41 GLUTEN INTOLERANCE: Status: ACTIVE | Noted: 2024-07-03

## 2024-07-03 NOTE — ASSESSMENT & PLAN NOTE
Patient presents today for 1 month follow-up to review all of her testing that has been completed in the past month for her lightheadedness.  We have completed several cardiac tests, echo was normal as well as carotid study.  After reviewing these test with her today, her and I have discussed her anxiety which is likely causing some of the symptoms.  Her and I discussed further treatment for her anxiety with a daily medication.  She does feel like she has obsessive thoughts especially over her health.  Hydroxyzine has not been helping with her symptoms as much as she had hoped.  Discussed use of SSRI medication such as Prozac which is weight neutral and has been shown to help with obsessive thoughts.  She is very agreeable with starting a low-dose of this medication.  She understands efficacy is not obtained until 4 to 6 weeks of use.  We discussed ADRs of medication.  Patient can still use hydroxyzine as needed with this medication.  She will follow-up with me in about 4 to 6 weeks for follow-up to determine success of treatment.

## 2024-07-03 NOTE — ASSESSMENT & PLAN NOTE
Patient with recent bowel movement changes in the past couple of months.  She states she has days where she will not have a bowel movement and then when she does it will be diarrhea throughout the entire day.  With recent change in bowels, recommend referral to GI for an evaluation colonoscopy.  No blood in the bowels.  No abdominal pain.  We will also test her for a gluten allergy with blood work.

## 2024-07-03 NOTE — ASSESSMENT & PLAN NOTE
Patient is seeing weight management in the next coming months for an evaluation.  I discussed with her today that several of her symptoms do appear to be due to deconditioning due to her weight, since we have had a negative cardiac workup.  Patient understands and is still agreeable with weight management evaluation.

## 2024-07-03 NOTE — ASSESSMENT & PLAN NOTE
Has not been able to get in with [facility]'s neurology yet however will be calling eventually to schedule.

## 2024-08-13 ENCOUNTER — OFFICE VISIT (OUTPATIENT)
Dept: OBGYN CLINIC | Facility: MEDICAL CENTER | Age: 39
End: 2024-08-13
Payer: COMMERCIAL

## 2024-08-13 ENCOUNTER — APPOINTMENT (OUTPATIENT)
Dept: RADIOLOGY | Facility: MEDICAL CENTER | Age: 39
End: 2024-08-13
Payer: COMMERCIAL

## 2024-08-13 VITALS
HEIGHT: 64 IN | WEIGHT: 274 LBS | SYSTOLIC BLOOD PRESSURE: 134 MMHG | BODY MASS INDEX: 46.78 KG/M2 | HEART RATE: 64 BPM | DIASTOLIC BLOOD PRESSURE: 82 MMHG

## 2024-08-13 DIAGNOSIS — M22.2X2 PATELLOFEMORAL DISORDER OF LEFT KNEE: ICD-10-CM

## 2024-08-13 DIAGNOSIS — M25.562 LEFT KNEE PAIN, UNSPECIFIED CHRONICITY: ICD-10-CM

## 2024-08-13 DIAGNOSIS — M25.562 CHRONIC PAIN OF LEFT KNEE: Primary | ICD-10-CM

## 2024-08-13 DIAGNOSIS — G89.29 CHRONIC PAIN OF LEFT KNEE: Primary | ICD-10-CM

## 2024-08-13 PROCEDURE — 73564 X-RAY EXAM KNEE 4 OR MORE: CPT

## 2024-08-13 PROCEDURE — 99203 OFFICE O/P NEW LOW 30 MIN: CPT | Performed by: EMERGENCY MEDICINE

## 2024-08-13 NOTE — PROGRESS NOTES
"    Assessment/Plan:    Diagnoses and all orders for this visit:    Chronic pain of left knee  -     XR knee 4+ vw left injury; Future  -     Ambulatory Referral to Physical Therapy; Future    Patellofemoral disorder of left knee  -     Ambulatory Referral to Physical Therapy; Future    Hx multiple CSIs however it is unclear how many and which knees.  Discuss MRI if no significant improvement.  T/C [facility]    Return in about 7 weeks (around 10/1/2024).      Subjective:   Patient ID: Raegan Ferrell is a 39 y.o. female.    NP presents for chronic left knee pain.  She has been experiencing intermittent anterior pain and trouble extending it.  She has a distant hx of patellar dislocation years ago but this resolved.  She has POSSIBLY undergone CSIs int eh past x 3 for knee tightness.          Review of Systems    The following portions of the patient's chart were reviewed and updated as appropriate:   Allergy:  No Known Allergies    Medications:    Current Outpatient Medications:     FLUoxetine (PROzac) 10 mg capsule, Take 1 capsule (10 mg total) by mouth daily (Patient not taking: Reported on 8/13/2024), Disp: 30 capsule, Rfl: 1    hydrOXYzine HCL (ATARAX) 25 mg tablet, Take 1 tablet (25 mg total) by mouth every 6 (six) hours as needed for anxiety (sleep) (Patient not taking: Reported on 7/2/2024), Disp: 60 tablet, Rfl: 0    Patient Active Problem List   Diagnosis    Migraine without status migrainosus, not intractable    Palpitations    BMI 45.0-49.9, adult (HCC)    Anxiety    Episodic lightheadedness    Gluten intolerance    Diarrhea       Objective:  /82   Pulse 64   Ht 5' 4.17\" (1.63 m)   Wt 124 kg (274 lb)   BMI 46.78 kg/m²     Left Knee Exam     Tenderness   The patient is experiencing tenderness in the medial joint line.    Range of Motion   Extension:  normal     Other   Erythema: absent  Sensation: normal            Physical Exam      Neurologic Exam    Procedures    I have personally reviewed " pertinent films in PACS. and I have personally reviewed the written report of the pertinent studies.   Xrays knee            Past Medical History:   Diagnosis Date    Headache(784.0) over 15 years       Past Surgical History:   Procedure Laterality Date     SECTION         Social History     Socioeconomic History    Marital status: Single     Spouse name: Not on file    Number of children: Not on file    Years of education: Not on file    Highest education level: Not on file   Occupational History    Not on file   Tobacco Use    Smoking status: Never    Smokeless tobacco: Never   Vaping Use    Vaping status: Never Used   Substance and Sexual Activity    Alcohol use: Not Currently     Comment: 1 or 2 monthly    Drug use: Never    Sexual activity: Yes     Partners: Female     Birth control/protection: None   Other Topics Concern    Not on file   Social History Narrative    Not on file     Social Determinants of Health     Financial Resource Strain: Not on file   Food Insecurity: Not on file   Transportation Needs: Not on file   Physical Activity: Not on file   Stress: Not on file   Social Connections: Not on file   Intimate Partner Violence: Not on file   Housing Stability: Not on file       Family History   Problem Relation Age of Onset    Breast cancer Mother     Breast cancer Maternal Grandmother

## 2024-09-05 ENCOUNTER — TELEPHONE (OUTPATIENT)
Dept: FAMILY MEDICINE CLINIC | Facility: CLINIC | Age: 39
End: 2024-09-05

## 2024-09-05 DIAGNOSIS — R09.1 PLEURISY: Primary | ICD-10-CM

## 2024-09-05 DIAGNOSIS — R07.81 PLEURITIC CHEST PAIN: ICD-10-CM

## 2024-09-06 ENCOUNTER — HOSPITAL ENCOUNTER (OUTPATIENT)
Dept: RADIOLOGY | Facility: HOSPITAL | Age: 39
Discharge: HOME | End: 2024-09-06
Payer: COMMERCIAL

## 2024-09-06 DIAGNOSIS — R07.81 PLEURITIC CHEST PAIN: ICD-10-CM

## 2024-09-06 PROCEDURE — 71046 X-RAY EXAM CHEST 2 VIEWS: CPT

## 2024-11-01 ENCOUNTER — APPOINTMENT (OUTPATIENT)
Dept: LAB | Facility: CLINIC | Age: 39
End: 2024-11-01
Payer: COMMERCIAL

## 2024-11-01 DIAGNOSIS — K90.41 GLUTEN INTOLERANCE: ICD-10-CM

## 2024-11-01 DIAGNOSIS — R19.7 DIARRHEA, UNSPECIFIED TYPE: ICD-10-CM

## 2024-11-01 LAB
GLIADIN PEPTIDE IGA SER-ACNC: 4 U/ML
GLIADIN PEPTIDE IGA SER-ACNC: NEGATIVE
GLIADIN PEPTIDE IGG SER-ACNC: <0.4 U/ML
GLIADIN PEPTIDE IGG SER-ACNC: NEGATIVE
IGA SERPL-MCNC: 87 MG/DL (ref 66–433)
TTG IGA SER-ACNC: <0.5 U/ML
TTG IGA SER-ACNC: NEGATIVE
TTG IGG SER-ACNC: <0.8 U/ML
TTG IGG SER-ACNC: NEGATIVE

## 2024-11-01 PROCEDURE — 36415 COLL VENOUS BLD VENIPUNCTURE: CPT

## 2024-11-01 PROCEDURE — 86258 DGP ANTIBODY EACH IG CLASS: CPT

## 2024-11-01 PROCEDURE — 86364 TISS TRNSGLTMNASE EA IG CLAS: CPT

## 2024-11-01 PROCEDURE — 82784 ASSAY IGA/IGD/IGG/IGM EACH: CPT

## 2024-11-21 ENCOUNTER — OFFICE VISIT (OUTPATIENT)
Dept: URGENT CARE | Facility: CLINIC | Age: 39
End: 2024-11-21
Payer: COMMERCIAL

## 2024-11-21 VITALS
SYSTOLIC BLOOD PRESSURE: 114 MMHG | RESPIRATION RATE: 22 BRPM | DIASTOLIC BLOOD PRESSURE: 68 MMHG | HEART RATE: 104 BPM | TEMPERATURE: 97.1 F | OXYGEN SATURATION: 98 %

## 2024-11-21 DIAGNOSIS — J06.9 VIRAL URI WITH COUGH: Primary | ICD-10-CM

## 2024-11-21 PROCEDURE — 99213 OFFICE O/P EST LOW 20 MIN: CPT | Performed by: NURSE PRACTITIONER

## 2024-11-21 RX ORDER — DEXTROMETHORPHAN HYDROBROMIDE AND PROMETHAZINE HYDROCHLORIDE 15; 6.25 MG/5ML; MG/5ML
5 SYRUP ORAL 4 TIMES DAILY PRN
Qty: 118 ML | Refills: 0 | Status: SHIPPED | OUTPATIENT
Start: 2024-11-21

## 2024-11-21 NOTE — PROGRESS NOTES
[facility]        NAME: Raegan Ferrell is a 39 y.o. female  : 1985    MRN: [id number]  DATE: 2024  TIME: 6:51 PM    Assessment and Plan   Viral URI with cough [J06.9]  1. Viral URI with cough  promethazine-dextromethorphan (PHENERGAN-DM) 6.25-15 mg/5 mL oral syrup        Discussed viral in etiology -   Phenergan dm sent to pharmacy   Instructions provided   F/u with pcp   Pt in agreement with plan     Patient Instructions     Follow up with PCP in 3-5 days.  Proceed to  ER if symptoms worsen.    Chief Complaint     Chief Complaint   Patient presents with    Cold Like Symptoms     Sore throat, right sided ear pain, mucous, headache, loss of voice, body aches, and fever.  Symptoms started Tuesday night         History of Present Illness   Raegan Ferrell presents to the clinic c/o    Cold Like Symptoms (Sore throat, right sided ear pain, mucous, headache, loss of voice, body aches, and fever.  Symptoms started Tuesday night)    Sore Throat   This is a new problem. The current episode started yesterday. The problem has been gradually worsening. The pain is worse on the right side. The maximum temperature recorded prior to her arrival was 100 - 100.9 F. The fever has been present for 1 to 2 days. The pain is at a severity of 6/10. The pain is moderate. Associated symptoms include congestion, coughing, ear pain, headaches, a hoarse voice, shortness of breath, swollen glands and trouble swallowing. Pertinent negatives include no abdominal pain, diarrhea, drooling, ear discharge, plugged ear sensation, neck pain, stridor or vomiting.       Review of Systems   Review of Systems   HENT:  Positive for congestion, ear pain, hoarse voice, sore throat and trouble swallowing. Negative for drooling and ear discharge.    Respiratory:  Positive for cough and shortness of breath. Negative for stridor.    Gastrointestinal:  Negative for abdominal pain, diarrhea and vomiting.   Musculoskeletal:  Negative  for neck pain.   Neurological:  Positive for headaches.   All other systems reviewed and are negative.        Current Medications     Long-Term Medications   Medication Sig Dispense Refill    FLUoxetine (PROzac) 10 mg capsule Take 1 capsule (10 mg total) by mouth daily (Patient not taking: Reported on 8/13/2024) 30 capsule 1    hydrOXYzine HCL (ATARAX) 25 mg tablet Take 1 tablet (25 mg total) by mouth every 6 (six) hours as needed for anxiety (sleep) (Patient not taking: Reported on 7/2/2024) 60 tablet 0       Current Allergies     Allergies as of 11/21/2024    (No Known Allergies)            The following portions of the patient's history were reviewed and updated as appropriate: allergies, current medications, past family history, past medical history, past social history, past surgical history and problem list.    Objective   /68   Pulse 104   Temp (!) 97.1 °F (36.2 °C)   Resp 22   SpO2 98%        Physical Exam     Physical Exam                 Normal breath sounds.   Abdominal:      General: Abdomen is flat. Bowel sounds are normal.      Palpations: Abdomen is soft.   Musculoskeletal:         General: Normal range of motion.      Cervical back: Full passive range of motion without pain, normal range of motion and neck supple.   Skin:     General: Skin is warm and dry.   Neurological:      General: No focal deficit present.      Mental Status: She is alert and oriented to person, place, and time.   Psychiatric:         Mood and Affect: Mood normal.         Speech: Speech normal.         Behavior: Behavior normal. Behavior is cooperative.         Thought Content: Thought content normal.         Judgment: Judgment normal.

## 2024-11-21 NOTE — PATIENT INSTRUCTIONS
"Recommend dayquil and advil as needed during the day   Promethazine DM at night for cough  Patient Education     Cough, runny nose, and the common cold   The Basics   Written by the doctors and editors at [facility]   What causes cough, runny nose, and other symptoms of the common cold? -- These symptoms are usually caused by a virus. Doctors also use the term \"viral upper respiratory infection\" or \"viral URI.\" Lots of different viruses can get into your nose, mouth, throat, or airways and cause cold symptoms.  Most people get better from a cold without any lasting problems. Even so, having a cold can be uncomfortable.  What are the symptoms of the common cold? -- Symptoms can include:   Sneezing   Coughing   Sniffling and runny nose   Sore throat   Chest congestion  In children, the common cold can also cause a fever. But adults do not usually get a fever when they have a cold.  Colds usually last about 3 to 7 days in adults and 10 days in children. But some people have symptoms for up to 2 weeks.  How can I tell if I have a cold or something else? -- Sometimes, it can be hard to tell if you have a cold or something else. Some cold symptoms can also be caused by other illnesses, such as COVID-19, the flu, or strep throat.  There are sometimes clues that can help you tell the difference:   COVID-19 often starts out very similar to a cold, although it can also cause a fever. If you have cold symptoms and have been around someone with COVID-19, you should get a test to find out if you have it, too.   The flu is more likely to cause fever, body aches, and extreme tiredness than a cold.   Strep throat usually causes severe throat pain. It can also cause a fever and swollen glands in the neck. People with strep throat usually do not have other cold symptoms like a stuffy nose or cough.  If you think that you might have an illness other than the common cold, call your doctor or nurse. They can tell you what to do.  Can " medicine help with a cold? -- Usually, a cold gets better on its own and does not need treatment. Because colds are usually caused by viruses, antibiotics will not help.  If you are a teen or an adult, you can try cough and cold medicines that you can get without a prescription. These medicines might help with your symptoms. But they can't cure your cold, or help you get well faster.  If you decide to try non-prescription cold medicines:   Read the directions on the label, and follow them carefully.   Do not combine 2 or more medicines that have acetaminophen in them. If you take too much acetaminophen, it can damage your liver.   If you have a heart condition, have high blood pressure, or take any prescription medicines, talk to your doctor or pharmacist before taking cold medicine. They can tell you which medicines are safe.  Some medicines are not safe for children:   If your child is younger than 6, do not give them any cold medicines. These medicines are not safe for young children. Even if your child is older than 6, cough and cold medicines are unlikely to help.   Never give aspirin to any child younger than 18 years old. In children, aspirin can cause a life-threatening condition called Reye syndrome.   When giving your child acetaminophen or other non-prescription medicines, never give more than the recommended dose.  Is there anything I can do on my own to feel better? -- Yes. You can:   Get plenty of rest.   Drink lots of fluids (water, juice, or broth) to stay hydrated. This will help replace any fluids lost if you have a runny nose or sweating from a fever. Warm tea or soup can help soothe a sore throat.   If the air in your home feels dry, use a cool-mist humidifier. This can help a stuffy nose and make it easier to breathe.   Use saline nose drops or spray to relieve stuffiness.   Avoid smoking, and stay away from places where people are smoking.  Can the common cold lead to more serious problems? --  In some cases, yes. In some people, having a cold can lead to:   Ear infections   Worse asthma symptoms   Sinus infections   Pneumonia or bronchitis (infections of the lungs)  Can colds be prevented? -- There are some things you can do to keep germs from spreading:   Wash your hands with soap and water often (figure 1) - This can also help prevent the spread of other illnesses like the flu and COVID-19.   Cover your cough - Cough into your elbow instead of your hands. Teach children to do this, too. Throw away used tissues right away.   Clean surfaces - The germs that cause the common cold can live on tables, door handles, and other surfaces for at least 2 hours.   Stay home if you are sick - When you do need to be around other people, consider wearing a face mask until you are feeling better.  When should I call the doctor? -- Contact your doctor or nurse if you:   Lose your sense of taste or smell   Have a fever of more than 100.4°F (38°C) that comes with shaking chills, loss of appetite, or trouble breathing   Have a very bad sore throat   Have a fever and also have lung disease, such as emphysema or asthma   Have a cough that lasts longer than 10 days or starts getting worse   Have chest pain when you cough or breathe deeply, have trouble breathing, or cough up blood  If you are older than 65, or if you have any chronic medical conditions such as diabetes, contact your doctor or nurse any time you get a long-lasting cough.  Take your child to the emergency department if they:   Become confused or stop responding to you   Have trouble breathing or have to work hard to breathe  Contact your child's doctor or nurse if the child:   Loses their sense of taste or smell or won't eat foods that they ate before   Has a very bad sore throat   Refuses to drink anything for a long time   Is younger than 4 months   Has a fever and is not acting like themselves   Has a cough that lasts for more than 2 weeks and is not getting  any better or is getting worse   Has a stuffed or runny nose that gets worse or does not get any better after 10 days   Has red eyes or yellow goop coming out of their eyes   Has ear pain, pulls at their ears, or shows other signs of having an ear infection  All topics are updated as new evidence becomes available and our peer review process is complete.  This topic retrieved from [employer] on: Feb 26, 2024.  Topic 97698 Version 30.0  Release: 32.2.4 - C32.56  © 2024 [employer] and/or its affiliates. All rights reserved.  figure 1: How to wash your hands     Wet your hands with clean water, and apply a small amount of soap. Lather and rub hands together for at least 20 seconds. Clean your wrists, palms, backs of your hands, between your fingers, tips of your fingers, thumbs, and under and around your nails. Rinse well, and dry your hands using a clean towel.  Graphic 423260 Version 7.0  Consumer Information Use and Disclaimer   Disclaimer: This generalized information is a limited summary of diagnosis, treatment, and/or medication information. It is not meant to be comprehensive and should be used as a tool to help the user understand and/or assess potential diagnostic and treatment options. It does NOT include all information about conditions, treatments, medications, side effects, or risks that may apply to a specific patient. It is not intended to be medical advice or a substitute for the medical advice, diagnosis, or treatment of a health care provider based on the health care provider's examination and assessment of a patient's specific and unique circumstances. Patients must speak with a health care provider for complete information about their health, medical questions, and treatment options, including any risks or benefits regarding use of medications. This information does not endorse any treatments or medications as safe, effective, or approved for treating a specific patient. [employer] Inc[employer] and its  affiliates disclaim any warranty or liability relating to this information or the use thereof.The use of this information is governed by the Terms of Use, available at https://www.wolterskluwer.com/en/know/clinical-effectiveness-terms. 2024© [employer] and its affiliates and/or licensors. All rights reserved.  Copyright   © 2024 [employer] and/or its affiliates. All rights reserved.

## 2024-12-10 ENCOUNTER — IMMUNIZATIONS (OUTPATIENT)
Dept: FAMILY MEDICINE CLINIC | Facility: CLINIC | Age: 39
End: 2024-12-10
Payer: COMMERCIAL

## 2024-12-10 DIAGNOSIS — Z23 ENCOUNTER FOR IMMUNIZATION: Primary | ICD-10-CM

## 2024-12-10 PROCEDURE — 90656 IIV3 VACC NO PRSV 0.5 ML IM: CPT

## 2024-12-10 PROCEDURE — 90471 IMMUNIZATION ADMIN: CPT

## 2025-02-03 ENCOUNTER — APPOINTMENT (EMERGENCY)
Dept: CT IMAGING | Facility: HOSPITAL | Age: 40
End: 2025-02-03
Payer: COMMERCIAL

## 2025-02-03 ENCOUNTER — HOSPITAL ENCOUNTER (EMERGENCY)
Facility: HOSPITAL | Age: 40
Discharge: HOME | End: 2025-02-03
Attending: EMERGENCY MEDICINE | Admitting: EMERGENCY MEDICINE
Payer: COMMERCIAL

## 2025-02-03 VITALS
TEMPERATURE: 97.7 F | HEART RATE: 63 BPM | BODY MASS INDEX: 47.99 KG/M2 | OXYGEN SATURATION: 99 % | SYSTOLIC BLOOD PRESSURE: 123 MMHG | RESPIRATION RATE: 20 BRPM | DIASTOLIC BLOOD PRESSURE: 80 MMHG | WEIGHT: 281.09 LBS

## 2025-02-03 DIAGNOSIS — K80.20 CHOLELITHIASIS: ICD-10-CM

## 2025-02-03 DIAGNOSIS — R07.81 RIB PAIN ON RIGHT SIDE: Primary | ICD-10-CM

## 2025-02-03 DIAGNOSIS — N20.0 NEPHROLITHIASIS: ICD-10-CM

## 2025-02-03 LAB
ALBUMIN SERPL BCG-MCNC: 4.2 G/DL (ref 3.5–5)
ALP SERPL-CCNC: 68 U/L (ref 34–104)
ALT SERPL W P-5'-P-CCNC: 9 U/L (ref 7–52)
ANION GAP SERPL CALCULATED.3IONS-SCNC: 5 MMOL/L (ref 4–13)
AST SERPL W P-5'-P-CCNC: 12 U/L (ref 13–39)
BACTERIA UR QL AUTO: ABNORMAL /HPF
BASOPHILS # BLD AUTO: 0.04 THOUSANDS/ΜL (ref 0–0.1)
BASOPHILS NFR BLD AUTO: 1 % (ref 0–1)
BILIRUB SERPL-MCNC: 0.35 MG/DL (ref 0.2–1)
BILIRUB UR QL STRIP: NEGATIVE
BUN SERPL-MCNC: 9 MG/DL (ref 5–25)
CALCIUM SERPL-MCNC: 9.3 MG/DL (ref 8.4–10.2)
CHLORIDE SERPL-SCNC: 107 MMOL/L (ref 96–108)
CLARITY UR: CLEAR
CO2 SERPL-SCNC: 26 MMOL/L (ref 21–32)
COLOR UR: YELLOW
CREAT SERPL-MCNC: 0.87 MG/DL (ref 0.6–1.3)
EOSINOPHIL # BLD AUTO: 0.2 THOUSAND/ΜL (ref 0–0.61)
EOSINOPHIL NFR BLD AUTO: 3 % (ref 0–6)
ERYTHROCYTE [DISTWIDTH] IN BLOOD BY AUTOMATED COUNT: 16.8 % (ref 11.6–15.1)
EXT PREGNANCY TEST URINE: NEGATIVE
EXT. CONTROL: NORMAL
GFR SERPL CREATININE-BSD FRML MDRD: 84 ML/MIN/1.73SQ M
GLUCOSE SERPL-MCNC: 94 MG/DL (ref 65–140)
GLUCOSE UR STRIP-MCNC: NEGATIVE MG/DL
HCT VFR BLD AUTO: 37.5 % (ref 34.8–46.1)
HGB BLD-MCNC: 11.7 G/DL (ref 11.5–15.4)
HGB UR QL STRIP.AUTO: ABNORMAL
IMM GRANULOCYTES # BLD AUTO: 0.02 THOUSAND/UL (ref 0–0.2)
IMM GRANULOCYTES NFR BLD AUTO: 0 % (ref 0–2)
KETONES UR STRIP-MCNC: NEGATIVE MG/DL
LEUKOCYTE ESTERASE UR QL STRIP: NEGATIVE
LIPASE SERPL-CCNC: 21 U/L (ref 11–82)
LYMPHOCYTES # BLD AUTO: 2.31 THOUSANDS/ΜL (ref 0.6–4.47)
LYMPHOCYTES NFR BLD AUTO: 30 % (ref 14–44)
MCH RBC QN AUTO: 24.9 PG (ref 26.8–34.3)
MCHC RBC AUTO-ENTMCNC: 31.2 G/DL (ref 31.4–37.4)
MCV RBC AUTO: 80 FL (ref 82–98)
MONOCYTES # BLD AUTO: 0.53 THOUSAND/ΜL (ref 0.17–1.22)
MONOCYTES NFR BLD AUTO: 7 % (ref 4–12)
MUCOUS THREADS UR QL AUTO: ABNORMAL
NEUTROPHILS # BLD AUTO: 4.66 THOUSANDS/ΜL (ref 1.85–7.62)
NEUTS SEG NFR BLD AUTO: 59 % (ref 43–75)
NITRITE UR QL STRIP: NEGATIVE
NON-SQ EPI CELLS URNS QL MICRO: ABNORMAL /HPF
NRBC BLD AUTO-RTO: 0 /100 WBCS
PH UR STRIP.AUTO: 7 [PH] (ref 4.5–8)
PLATELET # BLD AUTO: 367 THOUSANDS/UL (ref 149–390)
PMV BLD AUTO: 9 FL (ref 8.9–12.7)
POTASSIUM SERPL-SCNC: 4.1 MMOL/L (ref 3.5–5.3)
PROT SERPL-MCNC: 7.2 G/DL (ref 6.4–8.4)
PROT UR STRIP-MCNC: NEGATIVE MG/DL
RBC # BLD AUTO: 4.7 MILLION/UL (ref 3.81–5.12)
RBC #/AREA URNS AUTO: ABNORMAL /HPF
SODIUM SERPL-SCNC: 138 MMOL/L (ref 135–147)
SP GR UR STRIP.AUTO: 1.02 (ref 1–1.03)
UROBILINOGEN UR QL STRIP.AUTO: 0.2 E.U./DL
WBC # BLD AUTO: 7.76 THOUSAND/UL (ref 4.31–10.16)
WBC #/AREA URNS AUTO: ABNORMAL /HPF

## 2025-02-03 PROCEDURE — 96374 THER/PROPH/DIAG INJ IV PUSH: CPT

## 2025-02-03 PROCEDURE — 99283 EMERGENCY DEPT VISIT LOW MDM: CPT

## 2025-02-03 PROCEDURE — 74177 CT ABD & PELVIS W/CONTRAST: CPT

## 2025-02-03 PROCEDURE — 81001 URINALYSIS AUTO W/SCOPE: CPT

## 2025-02-03 PROCEDURE — 96361 HYDRATE IV INFUSION ADD-ON: CPT

## 2025-02-03 PROCEDURE — 36415 COLL VENOUS BLD VENIPUNCTURE: CPT

## 2025-02-03 PROCEDURE — 80053 COMPREHEN METABOLIC PANEL: CPT

## 2025-02-03 PROCEDURE — 99285 EMERGENCY DEPT VISIT HI MDM: CPT

## 2025-02-03 PROCEDURE — 81025 URINE PREGNANCY TEST: CPT

## 2025-02-03 PROCEDURE — 85025 COMPLETE CBC W/AUTO DIFF WBC: CPT

## 2025-02-03 PROCEDURE — 83690 ASSAY OF LIPASE: CPT

## 2025-02-03 RX ORDER — NAPROXEN 500 MG/1
500 TABLET ORAL 2 TIMES DAILY WITH MEALS
Qty: 15 TABLET | Refills: 0 | Status: SHIPPED | OUTPATIENT
Start: 2025-02-03

## 2025-02-03 RX ORDER — KETOROLAC TROMETHAMINE 30 MG/ML
15 INJECTION, SOLUTION INTRAMUSCULAR; INTRAVENOUS ONCE
Status: COMPLETED | OUTPATIENT
Start: 2025-02-03 | End: 2025-02-03

## 2025-02-03 RX ADMIN — IOHEXOL 100 ML: 350 INJECTION, SOLUTION INTRAVENOUS at 09:45

## 2025-02-03 RX ADMIN — SODIUM CHLORIDE 1000 ML: 0.9 INJECTION, SOLUTION INTRAVENOUS at 09:08

## 2025-02-03 RX ADMIN — KETOROLAC TROMETHAMINE 15 MG: 30 INJECTION, SOLUTION INTRAMUSCULAR; INTRAVENOUS at 09:10

## 2025-02-03 NOTE — Clinical Note
Raegan Ferrell was seen and treated in our emergency department on 2/3/2025.                Diagnosis: Flank pain    Raegan  may return to work on return date.    She may return on this date: 02/04/2025         If you have any questions or concerns, please don't hesitate to call.      Angelo Cisneros PA-C    ______________________________           _______________          _______________  Hospital Representative                              Date                                Time

## 2025-02-03 NOTE — DISCHARGE INSTRUCTIONS
Patient vies follow-up PCP.  Patient vies follow-up general surgeon amatory referral placed.  Patient vies return to ED with any worsening symptoms explained at discharge.  Please take prescribed medication as prescribed.    Patient was advised to return to the ED with any worsening symptoms that were explained on discharge including but not limited to chest pain, shortness of breath, irretractable vomiting or diarrhea, vision loss, loss of function, loss of sensation, syncope, hemoptysis, hematochezia, hematemesis, melena, decreased oral intake, feeling ill.

## 2025-02-03 NOTE — ED PROVIDER NOTES
Time reflects when diagnosis was documented in both MDM as applicable and the Disposition within this note       Time User Action Codes Description Comment    2/3/2025 10:54 AM Angelo Cisneros [R07.81] Rib pain on right side     2/3/2025 10:55 AM Angelo Cisneros [K80.20] Cholelithiasis     2/3/2025 10:55 AM Angelo Cisneros [N20.0] Nephrolithiasis           ED Disposition       ED Disposition   Discharge    Condition   Stable    Date/Time   Mon Feb 3, 2025 10:54 AM    Comment   Raegan Ferrell discharge to home/self care.                   Assessment & Plan       Medical Decision Making  39-year-old female presenting ED with chief complaint of flank pain.  Flank pain right sided.  She does endorse history of kidney stone but stated this feels different.  CVA tenderness on the right-hand side.  No abdominal pain or tenderness on exam.  Abdomen soft nontender bowel sounds are normal.  Cardiac pulmonary exam is benign.  Vitals unremarkable on presentation.  Patient did have symptomatic relief with Toradol in ED.  Basic labs showing no acute abnormalities UA did have leukocytes we will allow this to go to culture for possible infection and treat via culture.  Patient not endorsing any urinary symptoms at this time.  Also patient is due for menstruation which can obscure the test.  Lipase is normal.  CT abdomen pelvis showing bilateral nephrolithiasis within kidney.  She has cholelithiasis without acute cholecystitis and there is some fibroids of the uterus.  Otherwise no acute findings on abdomen/pelvis to explain the patient's pain.  Possibly biliary colic with cholelithiasis see patient is completely right sided.  She did also endorse some earlier nausea.  I did give referral to general surgery for follow-up for her gallstones.  Patient was given strict return to ED protocol with any worsening symptoms explained on discharge.  Disposition was explained with follow-ups.  Patient did have symptom relief  "with pharmacological therapies in ED stable for discharge and agreeable.    Patient understood diagnosis and treatment plan and had no further questions.  Patient was discharged in stable condition.  Patient was advised to follow-up with her PCP in 1 to 2 days.  Patient was advised to return to the ED with any worsening symptoms that were explained on discharge including but not limited to chest pain, shortness of breath, irretractable vomiting or diarrhea, vision loss, loss of function, loss of sensation, syncope, hemoptysis, hematochezia, hematemesis, melena, decreased oral intake, feeling ill.     Ddx-viral illness, viral syndrome, cholelithiasis, cholecystitis, nephrolithiasis, hydronephrosis, pyelonephritis    Portions of the record may have been created with voice recognition software. Occasional wrong word or \"sound a like\" substitutions may have occurred due to the inherent limitations of voice recognition software. Read the chart carefully and recognize, using context, where substitutions have occurred.      Amount and/or Complexity of Data Reviewed  Labs: ordered.     Details: See [facility]  Radiology: ordered.     Details: See [facility]    Risk  Prescription drug management.  Risk Details: Risk of worsening symptoms along with signs and symptoms worsening symptoms were thoroughly explained on discharge.  Risk of incomplete follow-up was discussed.  Patient had full understanding of all risks had no further questions and was discharged in stable condition.         ED Course as of 02/03/25 2013 Mon Feb 03, 2025   0842 Rightsided rib pain, 7/10, 10/10, no meds, nausea       Medications   sodium chloride 0.9 % bolus 1,000 mL (0 mL Intravenous Stopped 2/3/25 1110)   ketorolac (TORADOL) injection 15 mg (15 mg Intravenous Given 2/3/25 0910)   iohexol (OMNIPAQUE) 350 MG/ML injection (SINGLE-DOSE) 100 mL (100 mL Intravenous Given 2/3/25 0945)       ED Risk Strat Scores                          SBIRT 20yo+      Flowsheet Row " Most Recent Value   Initial Alcohol Screen: US AUDIT-C     1. How often do you have a drink containing alcohol? 2 Filed at: 2025   2. How many drinks containing alcohol do you have on a typical day you are drinking?  1 Filed at: 2025   3a. Male UNDER 65: How often do you have five or more drinks on one occasion? 0 Filed at: 2025   3b. FEMALE Any Age, or MALE 65+: How often do you have 4 or more drinks on one occassion? 0 Filed at: 2025   Audit-C Score 3 Filed at: 2025   LAURA: How many times in the past year have you...    Used an illegal drug or used a prescription medication for non-medical reasons? Never Filed at: 2025                            History of Present Illness       Chief Complaint   Patient presents with    Rib Pain     Pt reporting R sided rib pain, reports cooking and feeling pain, no injury, no coughing.        Past Medical History:   Diagnosis Date    Headache(784.0) over 15 years      Past Surgical History:   Procedure Laterality Date     SECTION        Family History   Problem Relation Age of Onset    Breast cancer Mother     Breast cancer Maternal Grandmother       Social History     Tobacco Use    Smoking status: Never    Smokeless tobacco: Never   Vaping Use    Vaping status: Never Used   Substance Use Topics    Alcohol use: Not Currently     Comment: 1 or 2 monthly    Drug use: Never      E-Cigarette/Vaping    E-Cigarette Use Never User       E-Cigarette/Vaping Substances    Nicotine No     THC No     CBD No     Flavoring No     Other No     Unknown No       I have reviewed and agree with the history as documented.     39-year-old female presented ED with chief complaint of left-sided flank pain.  Endorsing sudden onset today while cooking.  She does have past medical history of nephrolithiasis.  Patient is sitting she has not been experiencing any urinary symptoms.  She stated that the pain is a 7 out of 10 while  standing.  A 10 out of 10 while any kind of activity.  She denies take any medication before coming to the emergency department.  She denies any recent sick contacts or recent illnesses.  Denies any recent travel.  Denies nausea vomiting diarrhea.  Denies chills fevers diaphoresis. Patient denies any chest pain, shortness of breath, vomiting, diarrhea, chills, diaphoresis, fevers, loss of consciousness, syncope, urinary and bowel changes, abdominal pain, visual symptoms, vision loss, loss of function, loss of sensation, decreased oral intake, hemoptysis, hematochezia, hematemesis, melena, confusion.         Review of Systems   Constitutional:  Negative for activity change, appetite change, chills, diaphoresis, fatigue and fever.   HENT:  Negative for congestion, ear discharge, ear pain, postnasal drip, rhinorrhea, sinus pressure, sinus pain and sore throat.    Eyes:  Negative for photophobia, pain, discharge, redness, itching and visual disturbance.   Respiratory:  Negative for cough, chest tightness and shortness of breath.    Cardiovascular:  Negative for chest pain and palpitations.   Gastrointestinal:  Negative for abdominal distention, abdominal pain, constipation, diarrhea, nausea and vomiting.   Genitourinary:  Positive for flank pain. Negative for decreased urine volume, difficulty urinating, dysuria, frequency, hematuria, urgency, vaginal bleeding, vaginal discharge and vaginal pain.   Musculoskeletal:  Positive for arthralgias. Negative for back pain, joint swelling, myalgias, neck pain and neck stiffness.   Skin:  Negative for rash and wound.   Neurological:  Negative for dizziness, tremors, syncope, facial asymmetry, weakness, light-headedness, numbness and headaches.           Objective       ED Triage Vitals   Temperature Pulse Blood Pressure Respirations SpO2 Patient Position - Orthostatic VS   02/03/25 0831 02/03/25 0831 02/03/25 0831 02/03/25 0831 02/03/25 0831 02/03/25 0831   97.7 °F (36.5 °C) 80  151/77 20 97 % Sitting      Temp Source Heart Rate Source BP Location FiO2 (%) Pain Score    02/03/25 0831 02/03/25 0831 02/03/25 0831 -- 02/03/25 0910    Oral Monitor Right arm  6      Vitals      Date and Time Temp Pulse SpO2 Resp BP Pain Score FACES Pain Rating User   02/03/25 1110 -- 63 99 % 20 123/80 4 -- AW   02/03/25 1035 -- 65 98 % 20 125/73 -- -- AW   02/03/25 1033 -- -- -- -- -- 4 -- AW   02/03/25 0939 -- 71 99 % 20 125/72 4 -- AW   02/03/25 0938 -- -- -- -- -- 4 -- AW   02/03/25 0910 -- -- -- -- -- 6 -- AW   02/03/25 0831 97.7 °F (36.5 °C) 80 97 % 20 151/77 -- -- MF            Physical Exam  Constitutional:       General: She is not in acute distress.     Appearance: Normal appearance. She is not ill-appearing, toxic-appearing or diaphoretic.   HENT:      Head: Normocephalic.      Right Ear: Tympanic membrane, ear canal and external ear normal.      Left Ear: Tympanic membrane, ear canal and external ear normal.      Nose: Nose normal. No congestion or rhinorrhea.      Mouth/Throat:      Mouth: Mucous membranes are moist.      Pharynx: Oropharynx is clear. No oropharyngeal exudate or posterior oropharyngeal erythema.   Eyes:      General:         Right eye: No discharge.         Left eye: No discharge.      Extraocular Movements: Extraocular movements intact.      Conjunctiva/sclera: Conjunctivae normal.      Pupils: Pupils are equal, round, and reactive to light.   Cardiovascular:      Rate and Rhythm: Regular rhythm.      Pulses: Normal pulses.   Pulmonary:      Effort: Pulmonary effort is normal. No respiratory distress.      Breath sounds: Normal breath sounds. No stridor. No wheezing, rhonchi or rales.   Chest:      Chest wall: No tenderness.   Abdominal:      General: Bowel sounds are normal. There is no distension.      Palpations: Abdomen is soft.      Tenderness: There is no abdominal tenderness. There is right CVA tenderness. There is no left CVA tenderness, guarding or rebound.    Musculoskeletal:         General: No tenderness. Normal range of motion.      Cervical back: Normal range of motion and neck supple. No rigidity or tenderness.   Lymphadenopathy:      Cervical: No cervical adenopathy.   Skin:     General: Skin is warm and dry.      Capillary Refill: Capillary refill takes less than 2 seconds.      Findings: No rash.   Neurological:      Mental Status: She is alert and oriented to person, place, and time.   Psychiatric:         Mood and Affect: Mood normal.         Results Reviewed       Procedure Component Value Units Date/Time    Comprehensive metabolic panel [[id number]]  (Abnormal) Collected: 02/03/25 0909    Lab Status: Final result Specimen: Blood from Arm, Right Updated: 02/03/25 0935     Sodium 138 mmol/L      Potassium 4.1 mmol/L      Chloride 107 mmol/L      CO2 26 mmol/L      ANION GAP 5 mmol/L      BUN 9 mg/dL      Creatinine 0.87 mg/dL      Glucose 94 mg/dL      Calcium 9.3 mg/dL      AST 12 U/L      ALT 9 U/L      Alkaline Phosphatase 68 U/L      Total Protein 7.2 g/dL      Albumin 4.2 g/dL      Total Bilirubin 0.35 mg/dL      eGFR 84 ml/min/1.73sq m     Narrative:      National Kid[employer] guidelines for Chronic Kidney Disease (CKD):     Stage 1 with normal or high GFR (GFR > 90 mL/min/1.73 square meters)    Stage 2 Mild CKD (GFR = 60-89 mL/min/1.73 square meters)    Stage 3A Moderate CKD (GFR = 45-59 mL/min/1.73 square meters)    Stage 3B Moderate CKD (GFR = 30-44 mL/min/1.73 square meters)    Stage 4 Severe CKD (GFR = 15-29 mL/min/1.73 square meters)    Stage 5 End Stage CKD (GFR <15 mL/min/1.73 square meters)  Note: GFR calculation is accurate only with a steady state creatinine    Lipase [[id number]]  (Normal) Collected: 02/03/25 0909    Lab Status: Final result Specimen: Blood from Arm, Right Updated: 02/03/25 0935     Lipase 21 u/L     Urine Microscopic [[id number]]  (Abnormal) Collected: 02/03/25 0918    Lab Status: Final result Specimen: Urine,  Clean Catch Updated: 02/03/25 0935     RBC, UA 2-4 /hpf      WBC, UA 4-10 /hpf      Epithelial Cells Occasional /hpf      Bacteria, UA None Seen /hpf      MUCUS THREADS Occasional    CBC and differential [[id number]]  (Abnormal) Collected: 02/03/25 0909    Lab Status: Final result Specimen: Blood from Arm, Right Updated: 02/03/25 0920     WBC 7.76 Thousand/uL      RBC 4.70 Million/uL      Hemoglobin 11.7 g/dL      Hematocrit 37.5 %      MCV 80 fL      MCH 24.9 pg      MCHC 31.2 g/dL      RDW 16.8 %      MPV 9.0 fL      Platelets 367 Thousands/uL      nRBC 0 /100 WBCs      Segmented % 59 %      Immature Grans % 0 %      Lymphocytes % 30 %      Monocytes % 7 %      Eosinophils Relative 3 %      Basophils Relative 1 %      Absolute Neutrophils 4.66 Thousands/µL      Absolute Immature Grans 0.02 Thousand/uL      Absolute Lymphocytes 2.31 Thousands/µL      Absolute Monocytes 0.53 Thousand/µL      Eosinophils Absolute 0.20 Thousand/µL      Basophils Absolute 0.04 Thousands/µL     Urine Macroscopic, POC [[id number]]  (Abnormal) Collected: 02/03/25 0918    Lab Status: Final result Specimen: Urine Updated: 02/03/25 0920     Color, UA Yellow     Clarity, UA Clear     pH, UA 7.0     Leukocytes, UA Negative     Nitrite, UA Negative     Protein, UA Negative mg/dl      Glucose, UA Negative mg/dl      Ketones, UA Negative mg/dl      Urobilinogen, UA 0.2 E.U./dl      Bilirubin, UA Negative     Occult Blood, UA Moderate     Specific Gravity, UA 1.025    Narrative:      CLINITEK RESULT    POCT pregnancy, urine [[id number]]  (Normal) Collected: 02/03/25 0909    Lab Status: Final result Updated: 02/03/25 0910     EXT Preg Test, Ur Negative     Control Valid            CT abdomen pelvis with contrast   Final Interpretation by Esteban Remy MD (02/03 1007)      No acute findings in the abdomen or pelvis.      Nonobstructing bilateral nephrolithiasis.      Myomatous uterus.         Workstation performed: [id number]              Procedures    ED Medication and Procedure Management   None     Discharge Medication List as of 2/3/2025 10:56 AM        START taking these medications    Details   naproxen (Naprosyn) 500 mg tablet Take 1 tablet (500 mg total) by mouth 2 (two) times a day with meals, Starting Mon 2/3/2025, Normal             ED SEPSIS DOCUMENTATION   Time reflects when diagnosis was documented in both MDM as applicable and the Disposition within this note       Time User Action Codes Description Comment    2/3/2025 10:54 AM Angelo Cisneros [R07.81] Rib pain on right side     2/3/2025 10:55 AM Angelo Cisneros [K80.20] Cholelithiasis     2/3/2025 10:55 AM Angelo Cisneros [N20.0] Nephrolithiasis                  Angelo Cisneros PA-C  02/03/25 2013

## 2025-02-10 ENCOUNTER — TELEPHONE (OUTPATIENT)
Dept: DERMATOLOGY | Facility: CLINIC | Age: 40
End: 2025-02-10

## 2025-02-10 NOTE — TELEPHONE ENCOUNTER
Called patient to advise their upcoming appointment on 9/15 with Dr. Villalba needs to be rescheduled, as provider will no longer be seeing patient's in the [facility] office as of July 1st. Spoke with pt and appt has been r/s for 5/12 @ 11:00 with Dr. Villalba in [facility] office.

## 2025-03-07 ENCOUNTER — TELEPHONE (OUTPATIENT)
Dept: GASTROENTEROLOGY | Facility: CLINIC | Age: 40
End: 2025-03-07

## 2025-03-25 ENCOUNTER — TELEPHONE (OUTPATIENT)
Dept: BARIATRICS | Facility: CLINIC | Age: 40
End: 2025-03-25

## 2025-03-25 NOTE — TELEPHONE ENCOUNTER
Called patient and left a message to give the office a call back to reschedule the 6/3/25 appointment with Dr Smith due to scheduling error. 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

## 2025-04-17 NOTE — PROGRESS NOTES
A/P    1.  Annual exam    Last PAP - 1/10/22  - neg neg    Next due - today     Scheduling of pap discussed in detail    Previous abnormal years ago       Mammogram - last  12/1/23 - # 1 23 % risk    Next due now and recommend to get ABUS    Self breast exams discussed      2.  Menorrhagia   Was in works of going for a hysterectomy when changed jobs   She was told needs to be done robotically due to the or findings   Will get TV to see the endo lining    Will prob need a dc she had hard time getting the biopsy in past      Findings (copied from op note)    Grade 2 uterine prolapse  Colposcopy - TZ visualized; ACW at 8 o'clock position near TZ  Hysteroscopy: normal appearing uterine cavity, no intracavitary polyp/fibroid visualized, b/l tubal ostia visualized, normal appearing endocervical canal  Laparoscopy: fibroid uterus - three discrete intramural fibroids (each around 2-3 cm in size) visualized on the left anterior wall of the uterus near and at the lower uterine segment and a small posterior intramural fibroid as well, a white colored irregular bordered hyperemic plaque was visualized on the posterior left aspect of the uterus ~ 3 cm wide X 4 cm long - a biopsy of this area was obtained, moderately dense bladder to lower uterine segment adhesions, otherwise mobile uterus, normal appearing b/l ovaries with simple cyst (~2 cm) visualized on right adnexa, normal b/l fallopian tubes with small 1/2 cm simple right paratubal cyst, no foci of endometriosis visualized, adnexa not adherent to pelvic side walls, normal appearing anterior/posterior cul-de-sacs, normal appearing appendix, mild fatty infiltration of liver         40 y.o.,No LMP recorded.  C/O we spoke about the options   She has tried   Ablation - still getting heavy bleeding   Can't take BC pills she does not want them they make her feel bad  Was on depo also   She has family history of cancer she is BRCA negative           Past medical / social / surgical  / family history reviewed and updated   Medication and allergies discussed in detail and updated     Review of Systems - History obtained from chart review and the patient  General ROS: negative  Psychological ROS: negative  Ophthalmic ROS: negative  ENT ROS: negative  Allergy and Immunology ROS: negative  Hematological and Lymphatic ROS: negative  Endocrine ROS: negative  Breast ROS: negative for breast lumps  Respiratory ROS: no cough, shortness of breath, or wheezing  Cardiovascular ROS: no chest pain or dyspnea on exertion  Gastrointestinal ROS: no abdominal pain, change in bowel habits, or black or bloody stools  Genito-Urinary ROS: no dysuria, trouble voiding, or hematuria  + abnormal bleeding   Musculoskeletal ROS: negative  Neurological ROS: no TIA or stroke symptoms  Dermatological ROS: negative        Physical Exam  Vitals reviewed. Exam conducted with a chaperone present.   Constitutional:       Appearance: She is well-developed.   Neck:      Thyroid: No thyromegaly.   Cardiovascular:      Rate and Rhythm: Normal rate.      Heart sounds: Normal heart sounds.   Pulmonary:      Effort: Pulmonary effort is normal. No accessory muscle usage or respiratory distress.      Breath sounds: Normal air entry.   Chest:      Chest wall: No tenderness.   Breasts:     Breasts are symmetrical.      Right: No inverted nipple, mass or tenderness.      Left: No inverted nipple, mass or tenderness.   Abdominal:      General: There is no distension.      Palpations: Abdomen is soft.      Tenderness: There is no abdominal tenderness. There is no right CVA tenderness, left CVA tenderness, guarding or rebound.   Genitourinary:     General: Normal vulva.      Exam position: Lithotomy position.      Labia:         Right: No rash, tenderness, lesion or injury.         Left: No rash, tenderness, lesion or injury.       Vagina: Normal. No foreign body. No vaginal discharge or bleeding.      Cervix: Normal.      Uterus: Normal. Not  enlarged and not fixed.       Adnexa: Right adnexa normal and left adnexa normal.        Right: No mass, tenderness or fullness.          Left: No mass, tenderness or fullness.        Rectum: No external hemorrhoid.   Musculoskeletal:      Cervical back: Normal range of motion.   Lymphadenopathy:      Cervical: No cervical adenopathy.      Upper Body:      Right upper body: No supraclavicular adenopathy.      Left upper body: No supraclavicular adenopathy.   Neurological:      Mental Status: She is alert and oriented to person, place, and time.   Psychiatric:         Speech: Speech normal.         Behavior: Behavior normal.         Thought Content: Thought content normal.         Judgment: Judgment normal.

## 2025-04-18 ENCOUNTER — OFFICE VISIT (OUTPATIENT)
Dept: OBGYN CLINIC | Facility: MEDICAL CENTER | Age: 40
End: 2025-04-18
Payer: COMMERCIAL

## 2025-04-18 VITALS
WEIGHT: 280 LBS | BODY MASS INDEX: 47.8 KG/M2 | HEIGHT: 64 IN | DIASTOLIC BLOOD PRESSURE: 70 MMHG | SYSTOLIC BLOOD PRESSURE: 120 MMHG

## 2025-04-18 DIAGNOSIS — Z12.4 PAP SMEAR FOR CERVICAL CANCER SCREENING: Primary | ICD-10-CM

## 2025-04-18 DIAGNOSIS — N92.0 MENORRHAGIA WITH REGULAR CYCLE: ICD-10-CM

## 2025-04-18 DIAGNOSIS — Z12.31 ENCOUNTER FOR SCREENING MAMMOGRAM FOR MALIGNANT NEOPLASM OF BREAST: ICD-10-CM

## 2025-04-18 PROCEDURE — S0610 ANNUAL GYNECOLOGICAL EXAMINA: HCPCS | Performed by: OBSTETRICS & GYNECOLOGY

## 2025-04-18 PROCEDURE — G0145 SCR C/V CYTO,THINLAYER,RESCR: HCPCS | Performed by: OBSTETRICS & GYNECOLOGY

## 2025-04-18 PROCEDURE — G0476 HPV COMBO ASSAY CA SCREEN: HCPCS | Performed by: OBSTETRICS & GYNECOLOGY

## 2025-04-21 LAB
HPV HR 12 DNA CVX QL NAA+PROBE: NEGATIVE
HPV16 DNA CVX QL NAA+PROBE: NEGATIVE
HPV18 DNA CVX QL NAA+PROBE: NEGATIVE

## 2025-04-24 LAB
LAB AP GYN PRIMARY INTERPRETATION: NORMAL
Lab: NORMAL

## 2025-04-25 ENCOUNTER — RESULTS FOLLOW-UP (OUTPATIENT)
Dept: LABOR AND DELIVERY | Facility: HOSPITAL | Age: 40
End: 2025-04-25

## 2025-05-12 ENCOUNTER — OFFICE VISIT (OUTPATIENT)
Dept: DERMATOLOGY | Facility: CLINIC | Age: 40
End: 2025-05-12
Payer: COMMERCIAL

## 2025-05-12 VITALS — TEMPERATURE: 97.6 F

## 2025-05-12 DIAGNOSIS — D23.9 DERMATOFIBROMA: Primary | ICD-10-CM

## 2025-05-12 DIAGNOSIS — L81.4 LENTIGINES: ICD-10-CM

## 2025-05-12 DIAGNOSIS — D22.9 MULTIPLE NEVI: ICD-10-CM

## 2025-05-12 DIAGNOSIS — D18.01 CHERRY ANGIOMA: ICD-10-CM

## 2025-05-12 PROCEDURE — 99203 OFFICE O/P NEW LOW 30 MIN: CPT | Performed by: STUDENT IN AN ORGANIZED HEALTH CARE EDUCATION/TRAINING PROGRAM

## 2025-05-12 NOTE — PROGRESS NOTES
"[facility] Dermatology Clinic Note     Patient Name: Raegan Ferrell  Encounter Date: 5/12/25       Have you been cared for by a [facility] Dermatologist in the last 3 years and, if so, which description applies to you? NO. I am considered a \"new\" patient and must complete all patient intake questions. I am of child-bearing potential.     REVIEW OF SYSTEMS:  Have you recently had or currently have any of the following? Recent fever or chills? No  Any non-healing wound? No  Are you pregnant or planning to become pregnant? No  Are you currently or planning to be nursing or breast feeding? No   PAST MEDICAL HISTORY:  Have you personally ever had or currently have any of the following?  If \"YES,\" then please provide more detail. Skin cancer (such as Melanoma, Basal Cell Carcinoma, Squamous Cell Carcinoma?  No  Tuberculosis, HIV/AIDS, Hepatitis B or C: No  Radiation Treatment No   HISTORY OF IMMUNOSUPPRESSION:   Do you have a history of any of the following:  Systemic Immunosuppression such as Diabetes, Biologic or Immunotherapy, Chemotherapy, Organ Transplantation, Bone Marrow Transplantation or Prednsione?  No    Answering \"YES\" requires the addition of the dotphrase \"IMMUNOSUPPRESSED\" as the first diagnosis of the patient's visit.   FAMILY HISTORY:  Any \"first degree relatives\" (parent, brother, sister, or child) with the following?    Skin Cancer, Pancreatic or Other Cancer? No   PATIENT EXPERIENCE:    Do you want the Dermatologist to perform a COMPLETE skin exam today including a clinical examination under the \"bra and underwear\" areas?  Yes  If necessary, do we have your permission to call and leave a detailed message on your Preferred Phone number that includes your specific medical information?  Yes      No Known Allergies   Current Outpatient Medications:   •  naproxen (Naprosyn) 500 mg tablet, Take 1 tablet (500 mg total) by mouth 2 (two) times a day with meals (Patient not taking: Reported on 5/12/2025), Disp: " 15 tablet, Rfl: 0              Whom besides the patient is providing clinical information about today's encounter?   NO ADDITIONAL HISTORIAN (patient alone provided history)    Physical Exam and Assessment/Plan by Diagnosis:    DERMATOFIBROMA    Physical Exam:  Anatomic Location Affected:  left shin  Morphological Description:  Pink firm papule with surrounding hyperpigmentation, consistent dermoscopy, and + dimple sign  Pertinent Positives:  Pertinent Negatives: No itch, pain    Additional History of Present Condition: N/A    Assessment and Plan:  - History and physical consistent with dermatofibroma  - Educated that these lesions are generally benign   - No evidence of eruptive dermatofibromas (not >15 lesions with acute onset)  Based on a thorough discussion of this condition and the management approach to it (including a comprehensive discussion of the known risks, side effects and potential benefits of treatment), the patient (family) agrees to implement the following specific plan:    Educated patient to call clinic if lesion changes (enlarges, ulcerates)       MELANOCYTIC NEVI  -Relevant exam: Scattered over the trunk/extremities are homogenously pigmented brown macules and papules. ELM performed and without concerning findings. No outliers unless otherwise noted in today's note  - Exam and clinical history consistent with melanocytic nevi  - Counseled to return to clinic prior to scheduled appointment should any of these lesions change or should any new lesions of concern arise  - Counseled on use of sun protection daily. Reviewed latest FDA sunscreen guidelines, including use of broad spectrum (UVA and UVB blocking) sunscreen or sun protective clothing with SPF 30-50 every 2-3 hours and reapplied after exposure to water    LENTIGINES  OTHER SKIN CHANGES DUE TO CHRONIC EXPOSURE TO NONIONIZING RADIATION  - Relevant exam: Over sun exposed areas are brown macules. ELM performed and without concerning  findings.  - Exam and clinical history consistent with lentigines.  - Counseled to return to clinic prior to scheduled appointment should any of these lesions change or should any new lesions of concern arise.  - Recommended use of sunscreen as above and below.    CHERRY ANGIOMAS  - Relevant exam: Scattered over the trunk/extremities are red papules  - Exam and clinical history consistent with cherry angiomas  - Educated that these are benign    Scribe Attestation    I,:  Cira Hernandez MA am acting as a scribe while in the presence of the attending physician.:       I,:  Jesus Villalba DO personally performed the services described in this documentation    as scribed in my presence.:

## 2025-05-12 NOTE — PATIENT INSTRUCTIONS
DERMATOFIBROMA    Assessment and Plan:  - History and physical consistent with dermatofibroma  - Educated that these lesions are generally benign   - No evidence of eruptive dermatofibromas (not >15 lesions with acute onset)  Based on a thorough discussion of this condition and the management approach to it (including a comprehensive discussion of the known risks, side effects and potential benefits of treatment), the patient (family) agrees to implement the following specific plan:    Educated patient to call clinic if lesion changes (enlarges, ulcerates)       MELANOCYTIC NEVI  -Relevant exam: Scattered over the trunk/extremities are homogenously pigmented brown macules and papules. ELM performed and without concerning findings. No outliers unless otherwise noted in today's note  - Exam and clinical history consistent with melanocytic nevi  - Counseled to return to clinic prior to scheduled appointment should any of these lesions change or should any new lesions of concern arise  - Counseled on use of sun protection daily. Reviewed latest FDA sunscreen guidelines, including use of broad spectrum (UVA and UVB blocking) sunscreen or sun protective clothing with SPF 30-50 every 2-3 hours and reapplied after exposure to water    LENTIGINES  OTHER SKIN CHANGES DUE TO CHRONIC EXPOSURE TO NONIONIZING RADIATION  - Relevant exam: Over sun exposed areas are brown macules. ELM performed and without concerning findings.  - Exam and clinical history consistent with lentigines.  - Counseled to return to clinic prior to scheduled appointment should any of these lesions change or should any new lesions of concern arise.  - Recommended use of sunscreen as above and below.    CHERRY ANGIOMAS  - Relevant exam: Scattered over the trunk/extremities are red papules  - Exam and clinical history consistent with cherry angiomas  - Educated that these are benign

## 2025-06-27 ENCOUNTER — HOSPITAL ENCOUNTER (OUTPATIENT)
Dept: MAMMOGRAPHY | Facility: MEDICAL CENTER | Age: 40
Discharge: HOME | End: 2025-06-27
Payer: COMMERCIAL

## 2025-06-27 VITALS — BODY MASS INDEX: 47.8 KG/M2 | HEIGHT: 64 IN | WEIGHT: 280 LBS

## 2025-06-27 DIAGNOSIS — Z12.31 ENCOUNTER FOR SCREENING MAMMOGRAM FOR MALIGNANT NEOPLASM OF BREAST: ICD-10-CM

## 2025-06-27 PROCEDURE — 77063 BREAST TOMOSYNTHESIS BI: CPT

## 2025-06-27 PROCEDURE — 77067 SCR MAMMO BI INCL CAD: CPT

## 2025-07-16 ENCOUNTER — OFFICE VISIT (OUTPATIENT)
Dept: FAMILY MEDICINE CLINIC | Facility: CLINIC | Age: 40
End: 2025-07-16
Payer: COMMERCIAL

## 2025-07-16 VITALS
BODY MASS INDEX: 48.18 KG/M2 | OXYGEN SATURATION: 99 % | DIASTOLIC BLOOD PRESSURE: 90 MMHG | HEIGHT: 64 IN | HEART RATE: 84 BPM | SYSTOLIC BLOOD PRESSURE: 130 MMHG | TEMPERATURE: 97.5 F | WEIGHT: 282.2 LBS

## 2025-07-16 DIAGNOSIS — Z13.0 SCREENING FOR DEFICIENCY ANEMIA: ICD-10-CM

## 2025-07-16 DIAGNOSIS — R07.9 CHEST PAIN, UNSPECIFIED TYPE: ICD-10-CM

## 2025-07-16 DIAGNOSIS — Z13.6 SCREENING FOR CARDIOVASCULAR CONDITION: ICD-10-CM

## 2025-07-16 DIAGNOSIS — Z13.1 SCREENING FOR DIABETES MELLITUS: ICD-10-CM

## 2025-07-16 DIAGNOSIS — Z11.59 NEED FOR HEPATITIS C SCREENING TEST: ICD-10-CM

## 2025-07-16 DIAGNOSIS — Z23 ENCOUNTER FOR IMMUNIZATION: ICD-10-CM

## 2025-07-16 DIAGNOSIS — G47.9 SLEEPING DIFFICULTIES: ICD-10-CM

## 2025-07-16 DIAGNOSIS — Z11.4 SCREENING FOR HIV (HUMAN IMMUNODEFICIENCY VIRUS): ICD-10-CM

## 2025-07-16 DIAGNOSIS — Z13.29 SCREENING FOR THYROID DISORDER: ICD-10-CM

## 2025-07-16 DIAGNOSIS — Z00.00 ADULT GENERAL MEDICAL EXAMINATION: Primary | ICD-10-CM

## 2025-07-16 PROCEDURE — 99396 PREV VISIT EST AGE 40-64: CPT

## 2025-07-16 PROCEDURE — 90471 IMMUNIZATION ADMIN: CPT

## 2025-07-16 PROCEDURE — 90715 TDAP VACCINE 7 YRS/> IM: CPT

## 2025-07-16 PROCEDURE — 99214 OFFICE O/P EST MOD 30 MIN: CPT

## 2025-07-16 RX ORDER — HYDROXYZINE HYDROCHLORIDE 25 MG/1
25 TABLET, FILM COATED ORAL
Qty: 30 TABLET | Refills: 1 | Status: SHIPPED | OUTPATIENT
Start: 2025-07-16

## 2025-07-16 RX ORDER — NAPROXEN 500 MG/1
500 TABLET ORAL 2 TIMES DAILY WITH MEALS
Qty: 15 TABLET | Refills: 0 | Status: CANCELLED | OUTPATIENT
Start: 2025-07-16

## 2025-07-16 NOTE — PROGRESS NOTES
"Name: Raegan Ferrell      : 1985      MRN: [id number]  Encounter Provider: Krystin Damon PA-C  Encounter Date: 2025   Encounter department: [facility] [place]  :  Assessment & Plan  Liver lesion  Patient went to the ER in February for flank pain and CT scan noted simple hepatic cysts.  LFTs and lipase within normal limits.  - Hepatic cysts appear to be benign on CT however the patient notes that this does worry her so we will proceed with MRI to further investigate  Orders:    MRI abdomen w wo contrast; Future    Gastroesophageal reflux disease, unspecified whether esophagitis present  Patient says that recently she has been noticing that she has been getting heartburn several times a week.  She says this can occur even without p.o. intake and it sometimes does cause her to wake up with nausea.  She said that she takes over-the-counter Tums as needed which does alleviate the symptoms for the most part.  She is also suffering from chronic constipation ( see below).   - I did explain to the patient that I do suspect getting better control of her constipation will also alleviate some of her GERD symptoms however we can proceed with putting her on PPI at this time as well.  The patient says she prefers to wait to see if getting better control of her constipation is enough to alleviate her heartburn and if not then she would be willing to start PPI at that time   -EGD ordered to evaluate for hiatal hernia, gastritis, esophagitis, H. pylori,Ulcer disease  -Anti-GERD diet  Orders:    EGD; Future    Chronic constipation  Patient says she has had issues with constipation for \" her whole life.\"  She says the only time she is not constipated is when she eats dairy, which causes diarrhea.  She said she has actually been using ingestion of dairy as a form of a laxative as she does not move her bowels more than once a week or so.  She says that for the first few days without " "moving her bowels she feels completely fine but once she gets to day 4 without a bowel movement, she will start to feel full and bloated and at times will have some lower abdominal discomfort.  The patient does admit to drinking only about 30 ounces of water a day.  TSH was just ordered by patient's PCP.  -Please get TSH done  -Increase daily water intake to at least 64 ounces of water a day  -Start over-the-counter MiraLAX daily as needed constipation  -Start over-the-counter Lactaid pills as needed dairy intake  -Colonoscopy ordered due to chronicity of her constipation; instructions given; risks and benefits discussed  Orders:    Colonoscopy; Future    polyethylene glycol (GOLYTELY) 4000 mL solution; Take 4,000 mL by mouth once for 1 dose        History of Present Illness   HPI  Raegan Ferrell is a 40 y.o. female who presents for liver lesion.Patient says that recently she has been noticing that she has been getting heartburn several times a week.  She says this can occur even without p.o. intake and it sometimes does cause her to wake up with nausea.  She said that she takes over-the-counter Tums as needed which does alleviate the symptoms for the most part.  She is also suffering from chronic constipation. Patient says she has had issues with constipation for \" her whole life.\"  She says the only time she is not constipated is when she eats dairy, which causes diarrhea.  She said she has actually been using ingestion of dairy as a form of a laxative as she does not move her bowels more than once a week or so.  She says that for the first few days without moving her bowels she feels completely fine but once she gets to day 4 without a bowel movement, she will start to feel full and bloated and at times will have some lower abdominal discomfort.  The patient does admit to drinking only about 30 ounces of water a day.  Patient otherwise denies vomiting, trouble swallowing, frequent abdominal pain, unintentional " weight loss, fevers, chills, night sweats, family history of colon cancer, bloody or black bowel movements.  Patient has prior abdominal surgical history of exploratory lap.  Patient is not on blood thinners.  History obtained from: patient    Review of Systems   Constitutional:  Negative for chills, fever and unexpected weight change.   HENT:  Negative for trouble swallowing.    Gastrointestinal:  Positive for constipation and nausea. Negative for abdominal distention, abdominal pain, anal bleeding, blood in stool, diarrhea, rectal pain and vomiting.   All other systems reviewed and are negative.    Medical History Reviewed by provider this encounter:     .  Past Medical History   Past Medical History[1]  Past Surgical History[2]  Family History[3]   reports that she has never smoked. She has never used smokeless tobacco. She reports that she does not currently use alcohol. She reports that she does not use drugs.  Current Outpatient Medications   Medication Instructions    hydrOXYzine HCL (ATARAX) 25 mg, Oral, Daily at bedtime    naproxen (NAPROSYN) 500 mg, Oral, 2 times daily with meals    polyethylene glycol (GOLYTELY) 4000 mL solution 4,000 mL, Oral, Once   Allergies[4]   Medications Ordered Prior to Encounter[5]   Social History[6]     Objective   LMP 06/10/2025 (Exact Date)      Physical Exam  Constitutional:       Appearance: Normal appearance.   Abdominal:      General: Bowel sounds are normal. There is no distension.      Palpations: There is no mass.      Tenderness: There is no abdominal tenderness. There is no guarding or rebound.     Neurological:      Mental Status: She is alert.         Administrative Statements   I have spent a total time of 30 minutes in caring for this patient on the day of the visit/encounter including Diagnostic results, Prognosis, Risks and benefits of tx options, Instructions for management, Patient and family education, Importance of tx compliance, Risk factor reductions,  Impressions, Counseling / Coordination of care, Documenting in the medical record, Reviewing/placing orders in the medical record (including tests, medications, and/or procedures), Obtaining or reviewing history  , and Communicating with other healthcare professionals .         [1]   Past Medical History:  Diagnosis Date    BRCA1 negative     BRCA2 negative     Headache(784.0) over 15 years    Kidney stone     Migraine    [2]   Past Surgical History:  Procedure Laterality Date     SECTION     [3]   Family History  Problem Relation Name Age of Onset    Breast cancer Mother Smitha 45    Thyroid cancer Father  74    No Known Problems Sister      No Known Problems Sister      No Known Problems Sister      No Known Problems Sister      No Known Problems Daughter      No Known Problems Daughter      Breast cancer Maternal Grandmother Shani 50    No Known Problems Maternal Grandfather      No Known Problems Paternal Grandmother      No Known Problems Paternal Grandfather     [4] No Known Allergies  [5]   Current Outpatient Medications on File Prior to Visit   Medication Sig Dispense Refill    hydrOXYzine HCL (ATARAX) 25 mg tablet Take 1 tablet (25 mg total) by mouth daily at bedtime 30 tablet 1    naproxen (Naprosyn) 500 mg tablet Take 1 tablet (500 mg total) by mouth 2 (two) times a day with meals (Patient not taking: Reported on 2025) 15 tablet 0     No current facility-administered medications on file prior to visit.   [6]   Social History  Tobacco Use    Smoking status: Never    Smokeless tobacco: Never   Vaping Use    Vaping status: Never Used   Substance and Sexual Activity    Alcohol use: Not Currently     Comment: 1 or 2 monthly    Drug use: Never    Sexual activity: Yes     Partners: Female     Birth control/protection: None

## 2025-07-16 NOTE — PROGRESS NOTES
Adult Annual Physical  Name: Raegan Ferrell      : 1985      MRN: [id number]  Encounter Provider: Alysia Marroquin PA-C  Encounter Date: 2025   Encounter department: [facility]    :  Assessment & Plan  Adult general medical examination  Patient presents today for an annual physical. Patient's medical history and medication list were reviewed and updated. Annual physical questionnaire was given to review current diet, exercise level, sleep health, dental health, visual health, hearing status, and advanced care planning status.    Preventative health needs were reviewed and assessed today:   Immunizations:   Flu -defers  COVID -defers  Tdap -updated today as patient is due for Tdap, patient agreeable with updating this Pap-up-to-date  [id number]-date    Physical examination stable from previous.  Blood pressure slightly elevated on today's examination, will have patient monitor blood pressure closely at home and send me a message in about 2 weeks with update on her blood pressure.    Encouraged healthy diet and regular exercise.  Patient considering weight loss medication as she is struggled with weight gain for her entire life.  Will update lab work first and have patient return to care if she would like to discuss a GLP-1 medication.    Follow-up tentatively for 1 year but sooner depending on testing as ordered below.         Chest pain, unspecified type  Patient with persistent chest pain and chest pressure for the past 4 months.  EKG has been completed in the past, EKG in 2024 showed possible QT prolongation, patient has not had further Cardiologic workup since then.  Will order stress test for further evaluation as patient has already had echo and Holter monitor completed.  Has also had vascular carotid ultrasound which was normal -at this time does not seem to be a vascular cause for her symptoms.  Suggested to patient her blood pressure may be causing her chest  pain, she will monitor closely at home and let me know about 2 weeks what her home blood pressure readings are like.  Depending on stress test results, may need to refer to cardiology for further workup.  Advised patient with any significant worsening or changes with chest pain to go to the ER immediately for evaluation.  Patient agreeable plan today.  Orders:  •  Stress test only, exercise; Future    Sleeping difficulties  Patient was doing well with as needed use of hydroxyzine, refill provided today for patient.  Orders:  •  hydrOXYzine HCL (ATARAX) 25 mg tablet; Take 1 tablet (25 mg total) by mouth daily at bedtime    Screening for deficiency anemia    Orders:  •  CBC and differential; Future    Screening for diabetes mellitus    Orders:  •  Comprehensive metabolic panel; Future    Screening for thyroid disorder    Orders:  •  TSH, 3rd generation with Free T4 reflex; Future    Screening for cardiovascular condition    Orders:  •  Lipid Panel with Direct LDL reflex; Future    Screening for HIV (human immunodeficiency virus)    Orders:  •  HIV 1/2 AG/AB w Reflex [facility] for 2 yr old and above; Future    Need for hepatitis C screening test    Orders:  •  Hepatitis C Antibody; Future    Encounter for immunization    Orders:  •  TDAP VACCINE GREATER THAN OR EQUAL TO 8YO IM        Preventive Screenings:  - Diabetes Screening: screening up-to-date  - Cholesterol Screening: orders placed   - Hepatitis C screening: orders placed   - HIV screening: orders placed   - Cervical cancer screening: screening up-to-date   - Breast cancer screening: screening up-to-date   - Colon cancer screening: screening not indicated   - Lung cancer screening: screening not indicated     Immunizations:  - Immunizations due: Tdap  - Risks/benefits immunizations discussed    - Immunizations given per orders      Counseling/Anticipatory Guidance:  - Alcohol: discussed moderation in alcohol intake and recommendations for healthy alcohol use.   -  "Dental health: discussed importance of regular tooth brushing, flossing, and dental visits.   - Diet: discussed recommendations for a healthy/well-balanced diet.   - Exercise: the importance of regular exercise/physical activity was discussed. Recommend exercise 3-5 times per week for at least 30 minutes.          History of Present Illness     Adult Annual Physical:  Patient presents for annual physical. Reports having chest pain that has been persistent since March.  Reports pain being a pressure feeling in the center of her chest.  Reports a constant pressure in her chest but at times can flare and cause significant pain.  She occasionally has shortness of breath as she feels it is sometimes hard to catch her breath during these episodes.  She has not had any cough or wheezing.  She reports having stomach issues lately and is seeing GI tomorrow, no flare of her typical reflux symptoms recently.  She does have history of gallstones but does not report any right upper quadrant pain..     Diet and Physical Activity:  - Diet/Nutrition: no special diet.  - Exercise: 3-4 times a week on average.    Depression Screening:  - PHQ-2 Score: 0    General Health:  - Sleep: sleeps poorly and 1-3 hours of sleep on average.  - Hearing: decreased hearing left ear.  - Vision: wears glasses.  - Dental: regular dental visits.    /GYN Health:  - Follows with GYN: yes.   - History of STDs: no    Advanced Care Planning:  - Has an advanced directive?: no    - Has a durable medical POA?: no    - ACP document given to patient?: yes      Review of Systems   Constitutional:  Negative for chills, fatigue and fever.   Respiratory:  Negative for cough, chest tightness and shortness of breath.    Cardiovascular:  Positive for chest pain. Negative for palpitations and leg swelling.   Neurological:  Negative for dizziness, light-headedness and headaches.         Objective   /90   Pulse 84   Temp 97.5 °F (36.4 °C) (Temporal)   Ht 5' 4\" " (1.626 m)   Wt 128 kg (282 lb 3.2 oz)   LMP 07/12/2025 (Exact Date)   SpO2 99%   BMI 48.44 kg/m²     Physical Exam  Vitals and nursing note reviewed.   Constitutional:       General: She is not in acute distress.     Appearance: Normal appearance. She is normal weight. She is not ill-appearing.   HENT:      Head: Normocephalic and atraumatic.      Nose: Nose normal.      Mouth/Throat:      Mouth: Mucous membranes are moist.      Pharynx: Oropharynx is clear.   Neck:      Vascular: No carotid bruit.     Cardiovascular:      Rate and Rhythm: Normal rate and regular rhythm.   Pulmonary:      Effort: Pulmonary effort is normal. No respiratory distress.      Breath sounds: Normal breath sounds.     Musculoskeletal:      Cervical back: Normal range of motion.      Right lower leg: No edema.      Left lower leg: No edema.   Lymphadenopathy:      Cervical: No cervical adenopathy.     Skin:     General: Skin is warm and dry.      Coloration: Skin is not cyanotic or pale.     Neurological:      General: No focal deficit present.      Mental Status: She is alert and oriented to person, place, and time. Mental status is at baseline.     Psychiatric:         Mood and Affect: Mood normal.         Behavior: Behavior normal.         Judgment: Judgment normal.

## 2025-07-17 ENCOUNTER — OFFICE VISIT (OUTPATIENT)
Dept: GASTROENTEROLOGY | Facility: CLINIC | Age: 40
End: 2025-07-17
Payer: COMMERCIAL

## 2025-07-17 VITALS
DIASTOLIC BLOOD PRESSURE: 80 MMHG | BODY MASS INDEX: 47.91 KG/M2 | HEIGHT: 64 IN | WEIGHT: 280.6 LBS | TEMPERATURE: 97.8 F | SYSTOLIC BLOOD PRESSURE: 128 MMHG

## 2025-07-17 DIAGNOSIS — K76.9 LIVER LESION: Primary | ICD-10-CM

## 2025-07-17 DIAGNOSIS — K21.9 GASTROESOPHAGEAL REFLUX DISEASE, UNSPECIFIED WHETHER ESOPHAGITIS PRESENT: ICD-10-CM

## 2025-07-17 DIAGNOSIS — K59.09 CHRONIC CONSTIPATION: ICD-10-CM

## 2025-07-17 PROCEDURE — 99204 OFFICE O/P NEW MOD 45 MIN: CPT | Performed by: PHYSICIAN ASSISTANT

## 2025-07-17 RX ORDER — SODIUM CHLORIDE, SODIUM LACTATE, POTASSIUM CHLORIDE, CALCIUM CHLORIDE 600; 310; 30; 20 MG/100ML; MG/100ML; MG/100ML; MG/100ML
125 INJECTION, SOLUTION INTRAVENOUS CONTINUOUS
OUTPATIENT
Start: 2025-07-17

## 2025-07-17 NOTE — PATIENT INSTRUCTIONS
64 ounces of water/day  Start over-the-counter miralax daily as needed for constipation   Start over-the-counter lactaid pills as needed with dairy     Scheduled date of EGD/colonoscopy (as of today):08.25.25  Physician performing EGD/colonoscopy:DR SELBY  Location of EGD/colonoscopy:[facility]  Desired bowel prep reviewed with patient:BENITA  Instructions reviewed with patient by:NANETTE  Clearances:  N/A  Pt will call to schedule MRI

## 2025-07-23 ENCOUNTER — PATIENT MESSAGE (OUTPATIENT)
Age: 40
End: 2025-07-23

## 2025-07-23 NOTE — PATIENT COMMUNICATION
Patient scheduled in first available, sooner appointments available in [facility] -  the patient kindly declined. She was placed on the cancellation list for a sooner appointment if one becomes available.

## 2025-08-12 ENCOUNTER — TELEPHONE (OUTPATIENT)
Dept: GASTROENTEROLOGY | Facility: MEDICAL CENTER | Age: 40
End: 2025-08-12

## 2025-08-16 ENCOUNTER — APPOINTMENT (OUTPATIENT)
Dept: LAB | Facility: CLINIC | Age: 40
End: 2025-08-16
Attending: PREVENTIVE MEDICINE

## 2025-08-16 ENCOUNTER — APPOINTMENT (OUTPATIENT)
Dept: LAB | Facility: CLINIC | Age: 40
End: 2025-08-16

## 2025-08-16 PROCEDURE — 87389 HIV-1 AG W/HIV-1&-2 AB AG IA: CPT

## 2025-08-16 PROCEDURE — 85025 COMPLETE CBC W/AUTO DIFF WBC: CPT

## 2025-08-16 PROCEDURE — 84443 ASSAY THYROID STIM HORMONE: CPT

## 2025-08-16 PROCEDURE — 86803 HEPATITIS C AB TEST: CPT

## 2025-08-16 PROCEDURE — 80053 COMPREHEN METABOLIC PANEL: CPT

## 2025-08-21 ENCOUNTER — HOSPITAL ENCOUNTER (OUTPATIENT)
Dept: NON INVASIVE DIAGNOSTICS | Facility: HOSPITAL | Age: 40
Discharge: HOME | End: 2025-08-21
Payer: COMMERCIAL

## 2025-08-21 VITALS
HEART RATE: 96 BPM | OXYGEN SATURATION: 99 % | BODY MASS INDEX: 47.8 KG/M2 | HEIGHT: 64 IN | SYSTOLIC BLOOD PRESSURE: 128 MMHG | DIASTOLIC BLOOD PRESSURE: 100 MMHG | WEIGHT: 280 LBS

## 2025-08-21 DIAGNOSIS — R07.9 CHEST PAIN, UNSPECIFIED TYPE: ICD-10-CM

## 2025-08-21 LAB
MAX HR PERCENT: 95 %
MAX HR: 171 BPM
RATE PRESSURE PRODUCT: NORMAL
SL CV STRESS RECOVERY BP: NORMAL MMHG
SL CV STRESS RECOVERY HR: 113 BPM
SL CV STRESS RECOVERY O2 SAT: 99 %
SL CV STRESS STAGE REACHED: 3
STRESS ANGINA INDEX: 1
STRESS BASELINE BP: NORMAL MMHG
STRESS BASELINE HR: 96 BPM
STRESS DUKE TREADMILL SCORE: -2
STRESS O2 SAT REST: 99 %
STRESS PEAK HR: 171 BPM
STRESS POST ESTIMATED WORKLOAD: 8.5 METS
STRESS POST EXERCISE DUR MIN: 7 MIN
STRESS POST EXERCISE DUR SEC: 0 SEC
STRESS POST O2 SAT PEAK: 99 %
STRESS POST PEAK BP: 162 MMHG
STRESS ST DEPRESSION: 1 MM

## 2025-08-21 PROCEDURE — 93017 CV STRESS TEST TRACING ONLY: CPT

## 2025-08-21 PROCEDURE — 93018 CV STRESS TEST I&R ONLY: CPT

## 2025-08-21 PROCEDURE — 93016 CV STRESS TEST SUPVJ ONLY: CPT

## 2025-08-22 LAB
CHEST PAIN STATEMENT: NORMAL
MAX DIASTOLIC BP: 80 MMHG
MAX PREDICTED HEART RATE: 180 BPM
PROTOCOL NAME: NORMAL
REASON FOR TERMINATION: NORMAL
STRESS POST EXERCISE DUR MIN: 7 MIN
STRESS POST EXERCISE DUR SEC: 0 SEC
STRESS POST PEAK HR: 171 BPM
STRESS POST PEAK SYSTOLIC BP: 184 MMHG
TARGET HR FORMULA: NORMAL
TEST INDICATION: NORMAL